# Patient Record
Sex: FEMALE | Race: WHITE | NOT HISPANIC OR LATINO | Employment: UNEMPLOYED | URBAN - METROPOLITAN AREA
[De-identification: names, ages, dates, MRNs, and addresses within clinical notes are randomized per-mention and may not be internally consistent; named-entity substitution may affect disease eponyms.]

---

## 2018-02-19 DIAGNOSIS — E03.9 HYPOTHYROIDISM, UNSPECIFIED TYPE: Primary | ICD-10-CM

## 2018-02-19 DIAGNOSIS — E20.9 HYPOPARATHYROIDISM, UNSPECIFIED HYPOPARATHYROIDISM TYPE (HCC): ICD-10-CM

## 2018-02-19 RX ORDER — LEVOTHYROXINE SODIUM 112 UG/1
112 TABLET ORAL DAILY
Qty: 30 TABLET | Refills: 0 | Status: SHIPPED | OUTPATIENT
Start: 2018-02-19 | End: 2018-04-27 | Stop reason: DRUGHIGH

## 2018-02-19 RX ORDER — CALCITRIOL 0.25 UG/1
0.25 CAPSULE, LIQUID FILLED ORAL 2 TIMES DAILY
Qty: 60 CAPSULE | Refills: 0 | Status: SHIPPED | OUTPATIENT
Start: 2018-02-19 | End: 2018-04-27

## 2018-04-27 ENCOUNTER — TELEPHONE (OUTPATIENT)
Dept: ENDOCRINOLOGY | Facility: HOSPITAL | Age: 63
End: 2018-04-27

## 2018-04-27 ENCOUNTER — OFFICE VISIT (OUTPATIENT)
Dept: ENDOCRINOLOGY | Facility: HOSPITAL | Age: 63
End: 2018-04-27
Payer: COMMERCIAL

## 2018-04-27 VITALS
HEIGHT: 62 IN | BODY MASS INDEX: 33.49 KG/M2 | WEIGHT: 182 LBS | DIASTOLIC BLOOD PRESSURE: 90 MMHG | SYSTOLIC BLOOD PRESSURE: 122 MMHG | HEART RATE: 84 BPM

## 2018-04-27 DIAGNOSIS — E89.0 POSTOPERATIVE HYPOTHYROIDISM: Primary | ICD-10-CM

## 2018-04-27 DIAGNOSIS — I49.8 POTS (POSTURAL ORTHOSTATIC TACHYCARDIA SYNDROME): ICD-10-CM

## 2018-04-27 DIAGNOSIS — E20.9 HYPOPARATHYROIDISM, UNSPECIFIED HYPOPARATHYROIDISM TYPE (HCC): ICD-10-CM

## 2018-04-27 PROCEDURE — 99204 OFFICE O/P NEW MOD 45 MIN: CPT | Performed by: INTERNAL MEDICINE

## 2018-04-27 RX ORDER — LEVOTHYROXINE SODIUM 0.1 MG/1
100 TABLET ORAL DAILY
Qty: 90 TABLET | Refills: 3 | Status: SHIPPED | OUTPATIENT
Start: 2018-04-27 | End: 2018-04-27 | Stop reason: SDUPTHER

## 2018-04-27 RX ORDER — DEXTROAMPHETAMINE SULFATE 10 MG/1
10 CAPSULE, EXTENDED RELEASE ORAL DAILY
COMMUNITY
Start: 2018-02-14 | End: 2018-04-27 | Stop reason: SDUPTHER

## 2018-04-27 RX ORDER — FLUTICASONE PROPIONATE 50 MCG
SPRAY, SUSPENSION (ML) NASAL
COMMUNITY
Start: 2018-04-25 | End: 2020-05-18 | Stop reason: SDUPTHER

## 2018-04-27 RX ORDER — MAG HYDROX/ALUMINUM HYD/SIMETH 400-400-40
SUSPENSION, ORAL (FINAL DOSE FORM) ORAL
COMMUNITY

## 2018-04-27 RX ORDER — LEVOTHYROXINE SODIUM 0.1 MG/1
100 TABLET ORAL DAILY
COMMUNITY
Start: 2018-03-06 | End: 2018-04-27 | Stop reason: SDUPTHER

## 2018-04-27 RX ORDER — LEVOTHYROXINE SODIUM 0.1 MG/1
100 TABLET ORAL DAILY
Qty: 90 TABLET | Refills: 3 | Status: SHIPPED | OUTPATIENT
Start: 2018-04-27 | End: 2018-09-12 | Stop reason: SDUPTHER

## 2018-04-27 RX ORDER — CALCIUM CARBONATE 300MG(750)
TABLET,CHEWABLE ORAL
COMMUNITY

## 2018-04-27 RX ORDER — DEXTROAMPHETAMINE SULFATE 10 MG/1
10 CAPSULE, EXTENDED RELEASE ORAL DAILY
Qty: 90 CAPSULE | Refills: 0 | Status: SHIPPED | OUTPATIENT
Start: 2018-04-27 | End: 2018-09-12 | Stop reason: SDUPTHER

## 2018-04-27 NOTE — TELEPHONE ENCOUNTER
I need this patient's chart pulled to review things like surgical pathology, her orthostatic hypotension work up, and calcium labs  This is not a rush but would it be possible to get this to review? Thanks! pseudohyponatremia  correct Na near 135, also hemolyzed sample

## 2018-04-27 NOTE — LETTER
April 27, 2018     Levonne Counter  [address]  [address]    Patient: Harry Ambriz   YOB: 1955   Date of Visit: 4/27/2018       Dear Dr Rosa Metz: Thank you for referring Harry Ambriz to me for evaluation  Below are my notes for this consultation  If you have questions, please do not hesitate to call me  I look forward to following your patient along with you  Sincerely,        Beba Garrett DO        CC: No Recipients  Beba Garrett DO  4/27/2018  3:40 PM  Sign at close encounter  4/27/2018    Assessment/Plan      Diagnoses and all orders for this visit:    Postoperative hypothyroidism  -     Discontinue: levothyroxine 100 mcg tablet; Take 1 tablet (100 mcg total) by mouth daily  -     TSH, 3rd generation Lab Collect; Future  -     T4, free Lab Collect; Future  -     TSH, 3rd generation Lab Collect  -     T4, free Lab Collect  -     levothyroxine 100 mcg tablet; Take 1 tablet (100 mcg total) by mouth daily    Hypoparathyroidism, unspecified hypoparathyroidism type ([facility] [address] )  -     Comprehensive metabolic panel Lab Collect; Future  -     PTH, intact- Lab Collect; Future  -     Vitamin D 25 hydroxy Lab Collect; Future  -     Comprehensive metabolic panel Lab Collect  -     PTH, intact- Lab Collect  -     Vitamin D 25 hydroxy Lab Collect  -     Comprehensive metabolic panel Lab Collect; Future  -     PTH, intact- Lab Collect; Future  -     Vitamin D 25 hydroxy Lab Collect; Future  -     Comprehensive metabolic panel Lab Collect  -     PTH, intact- Lab Collect  -     Vitamin D 25 hydroxy Lab Collect    POTS (postural orthostatic tachycardia syndrome)  -     dextroamphetamine (DEXEDRINE SPANSULE) 10 MG 24 hr capsule; Take 1 capsule (10 mg total) by mouth daily Max Daily Amount: 10 mg    Other orders  -     Discontinue: dextroamphetamine (DEXEDRINE SPANSULE) 10 MG 24 hr capsule; Take 10 mg by mouth daily  -     Discontinue: levothyroxine 100 mcg tablet;  Take 100 mcg by mouth daily  -     fluticasone (FLONASE) 50 mcg/act nasal spray;   -     Cholecalciferol (VITAMIN D3) 5000 units CAPS; Take by mouth  -     Calcium Carbonate-Vitamin D (CALCIUM 600+D PO); Take by mouth  -     Melatonin 10 MG TBDP; Take by mouth        Assessment/Plan:  1  Postoperative hypothyroidism:  Her dose looks on target with levothyroxine 100 mcg daily with recent blood work  Will continue this recheck a TSH and free T4 before next appointment in 6 months  She did have her thyroidectomy at [facility] in February of 2017  Apparently there was a microcarcinoma there  I will acquire her old chart review these records for completeness  Likely we do not need to do any additional treatment for this  2   Hypoparathyroidism:  Suspect she had transient hypoparathyroidism after thyroidectomy  She is only on calcitriol 0 25 mcg daily as well as calcium once a day  Her recent parathyroid hormone levels detectable  Her calcium level was ample at 9 2 as well  I will ask her to hold her calcitriol and repeat a CMP, 25 hydroxy vitamin-D, PTH in 1 week  If this looks okay, we can continue off of this and consider her hypoparathyroidism improved  We discussed that if she develops any worsening muscle cramping, numbness, tingling, perioral paresthesias, difficulty breathing, she should contact us right away as she probably needs to restart Calcitriol and take extra calcium at that time  Advised her that we have a provider on-call 24/7 if she needs assistance in this regard  3   History of POTS: I refilled the patient's Dexedrine as she states her prior endocrinologist used to fill this for her but I do not have the chart for review at this time    I gave her a prescription for this, but I will need to review her chart for any future refills to assess prior workup, prior treatments, etc       CC:   Hypothyroidism    History of Present Illness     HPI: Melia Friday is a 59y o  year old female with history of hypothyroidism who presents to [facility] care  Previous patient doctor Nahid  She had a history of goiter and thyroid nodules reportedly that eventually underwent surgery for neck compressive symptoms at [facility] in February 2017  She then was treated for postoperative hypothyroidism levothyroxine 100 mcg daily  Overall, she feels okay but notes chronic joint aches, pains, muscle cramping  She reports she was told there was a small focus of thyroid cancer in her surgical pathology, but was told this did not need any further treatment  I do not have these records at this time  No neck compressive symptoms  She is also taking calcitriol 0 25 mcg daily as well as calcium 600 mg with vitamin-D daily along with 5000 units of vitamin-D daily for what I suspect is postoperative hypoparathyroidism  She states she was told there was 1 parathyroid on her surgical pathology  Recent blood work indicates a PTH level that is detectable at 16 with a reference range of 10-65 with a concurrent calcium level of 9 2  She reports chronic issues with joint aches pains and muscle cramping but denies any worsening in the symptoms as well as worsening in paresthesias or perioral paresthesias or shortness of breath  She is following with her family doctor for rheumatologic workup  She also has a reported history of POTS and takes DEXEDRINE for this and reportedly is prescribed this through her past endocrinologist which helps her POTS  I do not have these records at this time  Review of Systems   Constitutional: Positive for fatigue  Negative for chills and fever  HENT: Negative for trouble swallowing and voice change  Eyes: Negative for visual disturbance  Respiratory: Negative for shortness of breath  Cardiovascular: Negative for chest pain, palpitations and leg swelling  Gastrointestinal: Negative for abdominal pain, nausea and vomiting  Endocrine: Negative for polydipsia and polyuria  Musculoskeletal: Positive for arthralgias and myalgias  Skin: Negative for rash  Neurological: Positive for dizziness and light-headedness  Negative for tremors and weakness  Hematological: Negative for adenopathy  Psychiatric/Behavioral: Negative for agitation and confusion  Historical Information   History reviewed  No pertinent past medical history  History reviewed  No pertinent surgical history  Social History   History   Alcohol use Not on file     History   Drug use: Unknown     History   Smoking Status    Current Every Day Smoker    Packs/day: 0 33    Types: Cigarettes    Start date: [place]   Smokeless Tobacco    Never Used     Family History:   Family History   Problem Relation Age of Onset    Arthritis Mother     Melanoma Mother     Cancer Mother     Hypertension Mother     Colon cancer Father     Heart attack Brother     Hypertension Brother        Meds/Allergies   Current Outpatient Prescriptions   Medication Sig Dispense Refill    calcitriol (ROCALTROL) 0 25 mcg capsule Take 1 capsule (0 25 mcg total) by mouth 2 (two) times a day 60 capsule 0    Calcium Carbonate-Vitamin D (CALCIUM 600+D PO) Take by mouth      Cholecalciferol (VITAMIN D3) 5000 units CAPS Take by mouth      dextroamphetamine (DEXEDRINE SPANSULE) 10 MG 24 hr capsule Take 1 capsule (10 mg total) by mouth daily Max Daily Amount: 10 mg 90 capsule 0    fluticasone (FLONASE) 50 mcg/act nasal spray       levothyroxine 100 mcg tablet Take 1 tablet (100 mcg total) by mouth daily 90 tablet 3    Melatonin 10 MG TBDP Take by mouth       No current facility-administered medications for this visit  Allergies   Allergen Reactions    Other Shortness Of Breath and Swelling     Allergy to bee stings    Penicillins Other (See Comments)     Reaction unknown       Objective   Vitals: Blood pressure 122/90, pulse 84, height 5' 2" (1 575 m), weight 82 6 kg (182 lb)    Invasive Devices          No matching active lines, drains, or airways          Physical Exam   Constitutional: She is oriented to person, place, and time  She appears well-developed and well-nourished  No distress  HENT:   Head: Normocephalic and atraumatic  Mouth/Throat: No oropharyngeal exudate  Eyes: Conjunctivae and EOM are normal  Pupils are equal, round, and reactive to light  No scleral icterus  Neck: Normal range of motion  Neck supple  Cardiovascular: Normal rate and regular rhythm  No murmur heard  Pulmonary/Chest: Effort normal and breath sounds normal  She has no wheezes  Abdominal: Soft  Bowel sounds are normal  She exhibits no distension  There is no tenderness  Musculoskeletal: Normal range of motion  She exhibits no edema  Lymphadenopathy:     She has no cervical adenopathy  Neurological: She is alert and oriented to person, place, and time  She exhibits normal muscle tone  Skin: Skin is warm and dry  No rash noted  She is not diaphoretic  Psychiatric: She has a normal mood and affect  Her behavior is normal        The history was obtained from the review of the chart and from the patient  Lab Results:      [facility] labs from 04/23/2018:  Glucose 96, BUN 10, creatinine 0 81, GFR 78, sodium 142, potassium 4 2, calcium 9 2, albumin 4 3, bilirubin 0 4, alk phos 89, AST 15, ALT 10, TSH 0 705, total T4 8 6, T3 uptake 29, free thyroxine index 2 5, PTH 16 (10-65)      Future Appointments  Date Time Provider Kalia Madrid   10/30/2018 9:45 AM Anner Aase, CRNP [facility]   4/30/2019 9:10 AM Gardenia Faulkner DO [facility]

## 2018-04-27 NOTE — PROGRESS NOTES
4/27/2018    Assessment/Plan      Diagnoses and all orders for this visit:    Postoperative hypothyroidism  -     Discontinue: levothyroxine 100 mcg tablet; Take 1 tablet (100 mcg total) by mouth daily  -     TSH, 3rd generation Lab Collect; Future  -     T4, free Lab Collect; Future  -     TSH, 3rd generation Lab Collect  -     T4, free Lab Collect  -     levothyroxine 100 mcg tablet; Take 1 tablet (100 mcg total) by mouth daily    Hypoparathyroidism, unspecified hypoparathyroidism type ([facility] [address] )  -     Comprehensive metabolic panel Lab Collect; Future  -     PTH, intact- Lab Collect; Future  -     Vitamin D 25 hydroxy Lab Collect; Future  -     Comprehensive metabolic panel Lab Collect  -     PTH, intact- Lab Collect  -     Vitamin D 25 hydroxy Lab Collect  -     Comprehensive metabolic panel Lab Collect; Future  -     PTH, intact- Lab Collect; Future  -     Vitamin D 25 hydroxy Lab Collect; Future  -     Comprehensive metabolic panel Lab Collect  -     PTH, intact- Lab Collect  -     Vitamin D 25 hydroxy Lab Collect    POTS (postural orthostatic tachycardia syndrome)  -     dextroamphetamine (DEXEDRINE SPANSULE) 10 MG 24 hr capsule; Take 1 capsule (10 mg total) by mouth daily Max Daily Amount: 10 mg    Other orders  -     Discontinue: dextroamphetamine (DEXEDRINE SPANSULE) 10 MG 24 hr capsule; Take 10 mg by mouth daily  -     Discontinue: levothyroxine 100 mcg tablet; Take 100 mcg by mouth daily  -     fluticasone (FLONASE) 50 mcg/act nasal spray;   -     Cholecalciferol (VITAMIN D3) 5000 units CAPS; Take by mouth  -     Calcium Carbonate-Vitamin D (CALCIUM 600+D PO); Take by mouth  -     Melatonin 10 MG TBDP; Take by mouth        Assessment/Plan:  1  Postoperative hypothyroidism:  Her dose looks on target with levothyroxine 100 mcg daily with recent blood work  Will continue this recheck a TSH and free T4 before next appointment in 6 months  She did have her thyroidectomy at [facility] in February of 2017  Apparently there was a microcarcinoma there  I will acquire her old chart review these records for completeness  Likely we do not need to do any additional treatment for this  2   Hypoparathyroidism:  Suspect she had transient hypoparathyroidism after thyroidectomy  She is only on calcitriol 0 25 mcg daily as well as calcium once a day  Her recent parathyroid hormone levels detectable  Her calcium level was ample at 9 2 as well  I will ask her to hold her calcitriol and repeat a CMP, 25 hydroxy vitamin-D, PTH in 1 week  If this looks okay, we can continue off of this and consider her hypoparathyroidism improved  We discussed that if she develops any worsening muscle cramping, numbness, tingling, perioral paresthesias, difficulty breathing, she should contact us right away as she probably needs to restart Calcitriol and take extra calcium at that time  Advised her that we have a provider on-call 24/7 if she needs assistance in this regard  3   History of POTS: I refilled the patient's Dexedrine as she states her prior endocrinologist used to fill this for her but I do not have the chart for review at this time  I gave her a prescription for this, but I will need to review her chart for any future refills to assess prior workup, prior treatments, etc       CC:   Hypothyroidism    History of Present Illness     HPI: Mansi Greenberg is a 59y o  year old female with history of hypothyroidism who presents to establish care  Previous patient doctor Whitfield  She had a history of goiter and thyroid nodules reportedly that eventually underwent surgery for neck compressive symptoms at [facility] in February 2017  She then was treated for postoperative hypothyroidism levothyroxine 100 mcg daily  Overall, she feels okay but notes chronic joint aches, pains, muscle cramping    She reports she was told there was a small focus of thyroid cancer in her surgical pathology, but was told this did not need any further treatment  I do not have these records at this time  No neck compressive symptoms  She is also taking calcitriol 0 25 mcg daily as well as calcium 600 mg with vitamin-D daily along with 5000 units of vitamin-D daily for what I suspect is postoperative hypoparathyroidism  She states she was told there was 1 parathyroid on her surgical pathology  Recent blood work indicates a PTH level that is detectable at 16 with a reference range of 10-65 with a concurrent calcium level of 9 2  She reports chronic issues with joint aches pains and muscle cramping but denies any worsening in the symptoms as well as worsening in paresthesias or perioral paresthesias or shortness of breath  She is following with her family doctor for rheumatologic workup  She also has a reported history of POTS and takes DEXEDRINE for this and reportedly is prescribed this through her past endocrinologist which helps her POTS  I do not have these records at this time  Review of Systems   Constitutional: Positive for fatigue  Negative for chills and fever  HENT: Negative for trouble swallowing and voice change  Eyes: Negative for visual disturbance  Respiratory: Negative for shortness of breath  Cardiovascular: Negative for chest pain, palpitations and leg swelling  Gastrointestinal: Negative for abdominal pain, nausea and vomiting  Endocrine: Negative for polydipsia and polyuria  Musculoskeletal: Positive for arthralgias and myalgias  Skin: Negative for rash  Neurological: Positive for dizziness and light-headedness  Negative for tremors and weakness  Hematological: Negative for adenopathy  Psychiatric/Behavioral: Negative for agitation and confusion  Historical Information   History reviewed  No pertinent past medical history  History reviewed  No pertinent surgical history    Social History   History   Alcohol use Not on file     History   Drug use: Unknown     History   Smoking Status    Current Every Day Smoker    Packs/day: 0 33    Types: Cigarettes    Start date: 1988   Smokeless Tobacco    Never Used     Family History:   Family History   Problem Relation Age of Onset    Arthritis Mother     Melanoma Mother     Cancer Mother     Hypertension Mother     Colon cancer Father     Heart attack Brother     Hypertension Brother        Meds/Allergies   Current Outpatient Prescriptions   Medication Sig Dispense Refill    calcitriol (ROCALTROL) 0 25 mcg capsule Take 1 capsule (0 25 mcg total) by mouth 2 (two) times a day 60 capsule 0    Calcium Carbonate-Vitamin D (CALCIUM 600+D PO) Take by mouth      Cholecalciferol (VITAMIN D3) 5000 units CAPS Take by mouth      dextroamphetamine (DEXEDRINE SPANSULE) 10 MG 24 hr capsule Take 1 capsule (10 mg total) by mouth daily Max Daily Amount: 10 mg 90 capsule 0    fluticasone (FLONASE) 50 mcg/act nasal spray       levothyroxine 100 mcg tablet Take 1 tablet (100 mcg total) by mouth daily 90 tablet 3    Melatonin 10 MG TBDP Take by mouth       No current facility-administered medications for this visit  Allergies   Allergen Reactions    Other Shortness Of Breath and Swelling     Allergy to bee stings    Penicillins Other (See Comments)     Reaction unknown       Objective   Vitals: Blood pressure 122/90, pulse 84, height 5' 2" (1 575 m), weight 82 6 kg (182 lb)  Invasive Devices          No matching active lines, drains, or airways          Physical Exam   Constitutional: She is oriented to person, place, and time  She appears well-developed and well-nourished  No distress  HENT:   Head: Normocephalic and atraumatic  Mouth/Throat: No oropharyngeal exudate  Eyes: Conjunctivae and EOM are normal  Pupils are equal, round, and reactive to light  No scleral icterus  Neck: Normal range of motion  Neck supple  Cardiovascular: Normal rate and regular rhythm  No murmur heard    Pulmonary/Chest: Effort normal and breath sounds normal  She has no wheezes  Abdominal: Soft  Bowel sounds are normal  She exhibits no distension  There is no tenderness  Musculoskeletal: Normal range of motion  She exhibits no edema  Lymphadenopathy:     She has no cervical adenopathy  Neurological: She is alert and oriented to person, place, and time  She exhibits normal muscle tone  Skin: Skin is warm and dry  No rash noted  She is not diaphoretic  Psychiatric: She has a normal mood and affect  Her behavior is normal        The history was obtained from the review of the chart and from the patient  Lab Results:      [facility] labs from 04/23/2018:  Glucose 96, BUN 10, creatinine 0 81, GFR 78, sodium 142, potassium 4 2, calcium 9 2, albumin 4 3, bilirubin 0 4, alk phos 89, AST 15, ALT 10, TSH 0 705, total T4 8 6, T3 uptake 29, free thyroxine index 2 5, PTH 16 (10-65)      Future Appointments  Date Time Provider Kalia [place]   10/30/2018 9:45 AM FALGUNI Brown [facility]   4/30/2019 9:10 AM Emily Jalloh DO [facility]

## 2018-04-30 ENCOUNTER — TELEPHONE (OUTPATIENT)
Dept: ENDOCRINOLOGY | Facility: HOSPITAL | Age: 63
End: 2018-04-30

## 2018-04-30 NOTE — TELEPHONE ENCOUNTER
Received patient's chart for review  -regarding the patient's edema and orthostatic tachycardia, she has been on the dextroamphetamine spansule since 1985 from doctor Jason Andrea and has been doing well on this over the years   -surgical pathology from February 2017 showed in the right thyroid lobe an incidental papillary microcarcinoma, 0 2 cm, organ confined  Remainder of the gland told multinodular goiter  There was 1 normal cellular parathyroid gland  There is also 1 benign lymph node  The left thyroid lobes showed multinodular goiter

## 2018-05-05 LAB
25(OH)D3+25(OH)D2 SERPL-MCNC: 48 NG/ML (ref 30–100)
ALBUMIN SERPL-MCNC: 4.2 G/DL (ref 3.6–4.8)
ALBUMIN/GLOB SERPL: 1.6 {RATIO} (ref 1.2–2.2)
ALP SERPL-CCNC: 92 IU/L (ref 39–117)
ALT SERPL-CCNC: 11 IU/L (ref 0–32)
AST SERPL-CCNC: 17 IU/L (ref 0–40)
BILIRUB SERPL-MCNC: 0.2 MG/DL (ref 0–1.2)
BUN SERPL-MCNC: 15 MG/DL (ref 8–27)
BUN/CREAT SERPL: 19 (ref 12–28)
CALCIUM SERPL-MCNC: 8.9 MG/DL (ref 8.7–10.3)
CHLORIDE SERPL-SCNC: 102 MMOL/L (ref 96–106)
CO2 SERPL-SCNC: 24 MMOL/L (ref 18–29)
CREAT SERPL-MCNC: 0.79 MG/DL (ref 0.57–1)
GLOBULIN SER-MCNC: 2.6 G/DL (ref 1.5–4.5)
GLUCOSE SERPL-MCNC: 86 MG/DL (ref 65–99)
POTASSIUM SERPL-SCNC: 4.3 MMOL/L (ref 3.5–5.2)
PROT SERPL-MCNC: 6.8 G/DL (ref 6–8.5)
PTH-INTACT SERPL-MCNC: 10 PG/ML (ref 15–65)
SL AMB EGFR AFRICAN AMERICAN: 93 ML/MIN/1.73
SL AMB EGFR NON AFRICAN AMERICAN: 80 ML/MIN/1.73
SODIUM SERPL-SCNC: 143 MMOL/L (ref 134–144)

## 2018-05-08 DIAGNOSIS — E20.9 HYPOPARATHYROIDISM, UNSPECIFIED HYPOPARATHYROIDISM TYPE (HCC): Primary | ICD-10-CM

## 2018-05-09 DIAGNOSIS — E20.9 HYPOPARATHYROIDISM, UNSPECIFIED HYPOPARATHYROIDISM TYPE (HCC): Primary | ICD-10-CM

## 2018-05-19 LAB
ALBUMIN SERPL-MCNC: 4.4 G/DL (ref 3.6–4.8)
ALBUMIN/GLOB SERPL: 1.6 {RATIO} (ref 1.2–2.2)
ALP SERPL-CCNC: 90 IU/L (ref 39–117)
ALT SERPL-CCNC: 10 IU/L (ref 0–32)
AST SERPL-CCNC: 14 IU/L (ref 0–40)
BILIRUB SERPL-MCNC: 0.4 MG/DL (ref 0–1.2)
BUN SERPL-MCNC: 14 MG/DL (ref 8–27)
BUN/CREAT SERPL: 18 (ref 12–28)
CALCIUM SERPL-MCNC: 8.8 MG/DL (ref 8.7–10.3)
CHLORIDE SERPL-SCNC: 102 MMOL/L (ref 96–106)
CO2 SERPL-SCNC: 23 MMOL/L (ref 18–29)
CREAT SERPL-MCNC: 0.77 MG/DL (ref 0.57–1)
GLOBULIN SER-MCNC: 2.7 G/DL (ref 1.5–4.5)
GLUCOSE SERPL-MCNC: 93 MG/DL (ref 65–99)
POTASSIUM SERPL-SCNC: 4.2 MMOL/L (ref 3.5–5.2)
PROT SERPL-MCNC: 7.1 G/DL (ref 6–8.5)
PTH-INTACT SERPL-MCNC: 19 PG/ML (ref 15–65)
SL AMB EGFR AFRICAN AMERICAN: 96 ML/MIN/1.73
SL AMB EGFR NON AFRICAN AMERICAN: 83 ML/MIN/1.73
SODIUM SERPL-SCNC: 142 MMOL/L (ref 134–144)

## 2018-09-12 DIAGNOSIS — I49.8 POTS (POSTURAL ORTHOSTATIC TACHYCARDIA SYNDROME): ICD-10-CM

## 2018-09-12 DIAGNOSIS — E89.0 POSTOPERATIVE HYPOTHYROIDISM: ICD-10-CM

## 2018-09-12 RX ORDER — LEVOTHYROXINE SODIUM 0.1 MG/1
100 TABLET ORAL DAILY
Qty: 30 TABLET | Refills: 0 | Status: SHIPPED | OUTPATIENT
Start: 2018-09-12 | End: 2019-04-02 | Stop reason: SDUPTHER

## 2018-09-12 RX ORDER — DEXTROAMPHETAMINE SULFATE 10 MG/1
10 CAPSULE, EXTENDED RELEASE ORAL DAILY
Qty: 90 CAPSULE | Refills: 0 | Status: SHIPPED | OUTPATIENT
Start: 2018-09-12 | End: 2018-09-14 | Stop reason: SDUPTHER

## 2018-09-12 NOTE — TELEPHONE ENCOUNTER
Pt needs a 30 day supply of her Levothyroxine sent to the local pharmacy  She also needs a 90 day of the Dextroamphetamine sent to [facility]  You have filled that before but isn't that for ADHD? I did not set that up because I wasn't sure if you were ok with filling it

## 2018-09-14 DIAGNOSIS — I49.8 POTS (POSTURAL ORTHOSTATIC TACHYCARDIA SYNDROME): ICD-10-CM

## 2018-09-14 RX ORDER — DEXTROAMPHETAMINE SULFATE 10 MG/1
10 CAPSULE, EXTENDED RELEASE ORAL DAILY
Qty: 90 CAPSULE | Refills: 0 | Status: SHIPPED | OUTPATIENT
Start: 2018-09-14 | End: 2018-12-13 | Stop reason: SDUPTHER

## 2018-09-14 NOTE — TELEPHONE ENCOUNTER
I faxed a paper script to [facility] for patients Dextroamphetamine but they wont accept it because they can't tell if the signature is handwritten or stamped  They want a hard copy sent to them but I got rid of the script when the fax went through  Can you please reprint another one?

## 2018-10-27 LAB
25(OH)D3+25(OH)D2 SERPL-MCNC: 63.6 NG/ML (ref 30–100)
ALBUMIN SERPL-MCNC: 4.4 G/DL (ref 3.6–4.8)
ALBUMIN/GLOB SERPL: 1.8 {RATIO} (ref 1.2–2.2)
ALP SERPL-CCNC: 82 IU/L (ref 39–117)
ALT SERPL-CCNC: 14 IU/L (ref 0–32)
AST SERPL-CCNC: 16 IU/L (ref 0–40)
BILIRUB SERPL-MCNC: 0.4 MG/DL (ref 0–1.2)
BUN SERPL-MCNC: 9 MG/DL (ref 8–27)
BUN/CREAT SERPL: 11 (ref 12–28)
CALCIUM SERPL-MCNC: 9.1 MG/DL (ref 8.7–10.3)
CHLORIDE SERPL-SCNC: 104 MMOL/L (ref 96–106)
CO2 SERPL-SCNC: 22 MMOL/L (ref 20–29)
CREAT SERPL-MCNC: 0.79 MG/DL (ref 0.57–1)
GLOBULIN SER-MCNC: 2.5 G/DL (ref 1.5–4.5)
GLUCOSE SERPL-MCNC: 80 MG/DL (ref 65–99)
POTASSIUM SERPL-SCNC: 4.1 MMOL/L (ref 3.5–5.2)
PROT SERPL-MCNC: 6.9 G/DL (ref 6–8.5)
PTH-INTACT SERPL-MCNC: 14 PG/ML (ref 15–65)
SL AMB EGFR AFRICAN AMERICAN: 93 ML/MIN/1.73
SL AMB EGFR NON AFRICAN AMERICAN: 80 ML/MIN/1.73
SODIUM SERPL-SCNC: 144 MMOL/L (ref 134–144)
T4 FREE SERPL-MCNC: 1.36 NG/DL (ref 0.82–1.77)
TSH SERPL DL<=0.005 MIU/L-ACNC: 0.51 UIU/ML (ref 0.45–4.5)

## 2018-10-30 ENCOUNTER — OFFICE VISIT (OUTPATIENT)
Dept: ENDOCRINOLOGY | Facility: HOSPITAL | Age: 63
End: 2018-10-30
Payer: COMMERCIAL

## 2018-10-30 VITALS
HEART RATE: 102 BPM | WEIGHT: 186.8 LBS | SYSTOLIC BLOOD PRESSURE: 112 MMHG | HEIGHT: 62 IN | BODY MASS INDEX: 34.37 KG/M2 | DIASTOLIC BLOOD PRESSURE: 78 MMHG

## 2018-10-30 DIAGNOSIS — E89.0 POSTOPERATIVE HYPOTHYROIDISM: Primary | ICD-10-CM

## 2018-10-30 DIAGNOSIS — E20.9 HYPOPARATHYROIDISM, UNSPECIFIED HYPOPARATHYROIDISM TYPE (HCC): ICD-10-CM

## 2018-10-30 PROCEDURE — 99213 OFFICE O/P EST LOW 20 MIN: CPT | Performed by: NURSE PRACTITIONER

## 2018-10-30 RX ORDER — LANOLIN ALCOHOL/MO/W.PET/CERES
1000 CREAM (GRAM) TOPICAL
COMMUNITY

## 2018-10-30 NOTE — PROGRESS NOTES
Scotty Jimenez 59 y o  female MRN: [id number]    Encounter: [id number]      Assessment/Plan     Assessment: This is a 59y o -year-old female with postoperative hypothyroidism and hypoparathyroidism  Plan:  1  Postoperative hypothyroidism:   Generally, she feels well  Her most recent TSH and free T4 are normal  Will continue this recheck a TSH and free T4 before next appointment in 6 months        2  Hypoparathyroidism:  She is currently taking on calcitriol 0 25 mcg daily and calcium 750 mg once a daily  Her most recent calcium level is normal with a slightly low PTH 14  For now, she will continue Calcitriol 0 25 mcg every other day with calcium  She will contact the office if she develops any worsening muscle cramping, numbness, tingling, perioral paresthesias or difficulty breathing        CC:   Hypothyroidism follow-up    History of Present Illness     HPI:  59y o  year old female with history of hypothyroidism who presents for follow-up  She had a history of goiter and thyroid nodules reportedly that eventually underwent surgery for neck compressive symptoms at [facility] in February 2017  She then was treated for postoperative hypothyroidism levothyroxine 100 mcg daily  Her most recent TSH from October 26, 2018 is 0 508 with a free T4 of 1 36  Overall, she feels okay but notes chronic joint aches, pains, muscle cramping and she is cleaning her house  She reports she was told there was a small focus of thyroid cancer in her surgical pathology, but was told this did not need any further treatment  She denies any anterior neck discomfort, dysphagia or dysphonia      She is also taking calcitriol 0 25 mcg on Sunday, Tuesday, Thursday and Saturday, as well as, calcium 750 mg every evening with vitamin-D daily along with 5000 units of vitamin-D daily for postoperative hypoparathyroidism  her most recent calcium from October 26, 2018 is 9 1 with an albumin of 4 4 and PTH of 14    Her vitamin-D level is 63 6  She reports chronic issues with joint/back aches  and muscle cramping but denies any worsening paresthesias, perioral paresthesias or shortness of breath      Review of Systems   Constitutional: Positive for fatigue (tired at times)  Negative for chills and fever  HENT: Negative  Negative for trouble swallowing and voice change  Eyes: Negative  Respiratory: Negative  Negative for chest tightness and shortness of breath  Cardiovascular: Negative  Negative for chest pain  Gastrointestinal: Negative  Negative for abdominal pain, constipation, diarrhea and vomiting  Endocrine: Positive for cold intolerance (recently)  Negative for heat intolerance, polydipsia, polyphagia and polyuria  Genitourinary: Negative  Musculoskeletal: Positive for back pain (chronic low back pain) and myalgias (cramping in hands at times)  Skin: Negative  Allergic/Immunologic: Negative  Neurological: Positive for dizziness (very infrequent)  Negative for syncope, light-headedness and headaches  Hematological: Negative  Psychiatric/Behavioral: Positive for sleep disturbance (difficutly getting to sleep at times)  All other systems reviewed and are negative  Historical Information   No past medical history on file  No past surgical history on file    Social History   History   Alcohol use Not on file     History   Drug use: Unknown     History   Smoking Status    Current Every Day Smoker    Packs/day: 0 33    Types: Cigarettes    Start date: 80   Smokeless Tobacco    Never Used     Family History:   Family History   Problem Relation Age of Onset    Arthritis Mother     Melanoma Mother     Cancer Mother     Hypertension Mother     Colon cancer Father     Heart attack Brother     Hypertension Brother        Meds/Allergies   Current Outpatient Prescriptions   Medication Sig Dispense Refill    Calcium Carbonate-Vitamin D (CALCIUM 600+D PO) Take by mouth      Cholecalciferol (VITAMIN D3) 5000 units CAPS Take by mouth      cyanocobalamin (VITAMIN B-12) 1,000 mcg tablet Take 1,000 mcg by mouth daily      dextroamphetamine (DEXEDRINE SPANSULE) 10 MG 24 hr capsule Take 1 capsule (10 mg total) by mouth daily Max Daily Amount: 10 mg 90 capsule 0    fluticasone (FLONASE) 50 mcg/act nasal spray       levothyroxine 100 mcg tablet Take 1 tablet (100 mcg total) by mouth daily 30 tablet 0    Melatonin 10 MG TBDP Take by mouth       No current facility-administered medications for this visit  Allergies   Allergen Reactions    Other Shortness Of Breath and Swelling     Allergy to bee stings    Penicillins Other (See Comments)     Reaction unknown       Objective   Vitals: Blood pressure 112/78, pulse 102, height 5' 2" (1 575 m), weight 84 7 kg (186 lb 12 8 oz)  Physical Exam   Constitutional: She is oriented to person, place, and time  She appears well-developed and well-nourished  No distress  Overweight   HENT:   Head: Normocephalic and atraumatic  Mouth/Throat: Oropharynx is clear and moist    Eyes: Pupils are equal, round, and reactive to light  Conjunctivae and EOM are normal  Right eye exhibits no discharge  Left eye exhibits no discharge  No scleral icterus  Wears glasses   Neck: Normal range of motion  Neck supple  No JVD present  No tracheal deviation present  No thyromegaly present  Cardiovascular: Normal rate, regular rhythm, normal heart sounds and intact distal pulses  Exam reveals no gallop and no friction rub  No murmur heard  Pulmonary/Chest: Effort normal and breath sounds normal  No stridor  No respiratory distress  She has no wheezes  She has no rales  She exhibits no tenderness  Abdominal: Soft  Bowel sounds are normal  She exhibits no distension  There is no tenderness  Musculoskeletal: Normal range of motion  She exhibits no edema, tenderness or deformity  Lymphadenopathy:     She has no cervical adenopathy     Neurological: She is alert and oriented to person, place, and time  She displays normal reflexes  No cranial nerve deficit  Coordination normal    Skin: Skin is warm and dry  No rash noted  No erythema  No pallor  Dry skin   Psychiatric: She has a normal mood and affect  Her behavior is normal  Judgment and thought content normal    Vitals reviewed  Lab Results:   Lab Results   Component Value Date/Time    Free t4 1 36 10/26/2018 10:09 AM       Portions of the record may have been created with voice recognition software  Occasional wrong word or "sound a like" substitutions may have occurred due to the inherent limitations of voice recognition software  Read the chart carefully and recognize, using context, where substitutions have occurred

## 2018-12-13 DIAGNOSIS — I49.8 POTS (POSTURAL ORTHOSTATIC TACHYCARDIA SYNDROME): ICD-10-CM

## 2018-12-13 RX ORDER — DEXTROAMPHETAMINE SULFATE 10 MG/1
10 CAPSULE, EXTENDED RELEASE ORAL DAILY
Qty: 90 CAPSULE | Refills: 0 | Status: SHIPPED | OUTPATIENT
Start: 2018-12-13 | End: 2018-12-24 | Stop reason: SDUPTHER

## 2018-12-24 ENCOUNTER — TELEPHONE (OUTPATIENT)
Dept: ENDOCRINOLOGY | Facility: HOSPITAL | Age: 63
End: 2018-12-24

## 2018-12-24 DIAGNOSIS — I49.8 POTS (POSTURAL ORTHOSTATIC TACHYCARDIA SYNDROME): ICD-10-CM

## 2018-12-24 PROBLEM — G90.A POTS (POSTURAL ORTHOSTATIC TACHYCARDIA SYNDROME): Status: ACTIVE | Noted: 2018-12-24

## 2018-12-24 RX ORDER — DEXTROAMPHETAMINE SULFATE 10 MG/1
10 CAPSULE, EXTENDED RELEASE ORAL DAILY
Qty: 30 CAPSULE | Refills: 0 | Status: SHIPPED | OUTPATIENT
Start: 2018-12-24 | End: 2019-04-02 | Stop reason: SDUPTHER

## 2018-12-24 RX ORDER — DEXTROAMPHETAMINE SULFATE 10 MG/1
10 CAPSULE, EXTENDED RELEASE ORAL DAILY
Qty: 90 CAPSULE | Refills: 0 | Status: SHIPPED | OUTPATIENT
Start: 2018-12-24 | End: 2018-12-24 | Stop reason: SDUPTHER

## 2018-12-24 NOTE — TELEPHONE ENCOUNTER
Pt called last week to have her Dextroamphetamine to [facility] and It never got sent  The script was printed but I don't think it was signed and faxed  Pt is asking you to send 30 days to a local pharmacy in her chart and 90 days to [facility]  I can only set up the refill for one of them so I set it up for [facility]  The script is going to print so I will stamp your signature and fax it and hope they accept it

## 2019-04-02 DIAGNOSIS — E89.0 POSTOPERATIVE HYPOTHYROIDISM: ICD-10-CM

## 2019-04-02 DIAGNOSIS — E20.9 HYPOPARATHYROIDISM, UNSPECIFIED HYPOPARATHYROIDISM TYPE (HCC): Primary | ICD-10-CM

## 2019-04-02 DIAGNOSIS — I49.8 POTS (POSTURAL ORTHOSTATIC TACHYCARDIA SYNDROME): ICD-10-CM

## 2019-04-02 RX ORDER — DEXTROAMPHETAMINE SULFATE 10 MG/1
10 CAPSULE, EXTENDED RELEASE ORAL DAILY
Qty: 90 CAPSULE | Refills: 0 | Status: SHIPPED | OUTPATIENT
Start: 2019-04-02 | End: 2019-07-17 | Stop reason: SDUPTHER

## 2019-04-02 RX ORDER — CALCITRIOL 0.25 UG/1
CAPSULE, LIQUID FILLED ORAL
Qty: 45 CAPSULE | Refills: 1 | Status: SHIPPED | OUTPATIENT
Start: 2019-04-02 | End: 2019-07-17 | Stop reason: SDUPTHER

## 2019-04-02 RX ORDER — LEVOTHYROXINE SODIUM 0.1 MG/1
100 TABLET ORAL DAILY
Qty: 90 TABLET | Refills: 1 | Status: SHIPPED | OUTPATIENT
Start: 2019-04-02 | End: 2019-06-21 | Stop reason: SDUPTHER

## 2019-04-24 LAB
ALBUMIN SERPL-MCNC: 4.3 G/DL (ref 3.6–4.8)
ALBUMIN/GLOB SERPL: 1.7 {RATIO} (ref 1.2–2.2)
ALP SERPL-CCNC: 73 IU/L (ref 39–117)
ALT SERPL-CCNC: 10 IU/L (ref 0–32)
AST SERPL-CCNC: 14 IU/L (ref 0–40)
BILIRUB SERPL-MCNC: 0.3 MG/DL (ref 0–1.2)
BUN SERPL-MCNC: 12 MG/DL (ref 8–27)
BUN/CREAT SERPL: 15 (ref 12–28)
CALCIUM SERPL-MCNC: 9 MG/DL (ref 8.7–10.3)
CHLORIDE SERPL-SCNC: 105 MMOL/L (ref 96–106)
CO2 SERPL-SCNC: 23 MMOL/L (ref 20–29)
CREAT SERPL-MCNC: 0.82 MG/DL (ref 0.57–1)
GLOBULIN SER-MCNC: 2.6 G/DL (ref 1.5–4.5)
GLUCOSE SERPL-MCNC: 88 MG/DL (ref 65–99)
PHOSPHATE SERPL-MCNC: 3.7 MG/DL (ref 2.5–4.5)
POTASSIUM SERPL-SCNC: 4.1 MMOL/L (ref 3.5–5.2)
PROT SERPL-MCNC: 6.9 G/DL (ref 6–8.5)
PTH-INTACT SERPL-MCNC: 15 PG/ML (ref 15–65)
SL AMB EGFR AFRICAN AMERICAN: 88 ML/MIN/1.73
SL AMB EGFR NON AFRICAN AMERICAN: 76 ML/MIN/1.73
SODIUM SERPL-SCNC: 145 MMOL/L (ref 134–144)
T4 FREE SERPL-MCNC: 1.62 NG/DL (ref 0.82–1.77)
TSH SERPL DL<=0.005 MIU/L-ACNC: 0.43 UIU/ML (ref 0.45–4.5)

## 2019-05-09 ENCOUNTER — OFFICE VISIT (OUTPATIENT)
Dept: ENDOCRINOLOGY | Facility: HOSPITAL | Age: 64
End: 2019-05-09
Payer: COMMERCIAL

## 2019-05-09 VITALS
HEIGHT: 62 IN | SYSTOLIC BLOOD PRESSURE: 102 MMHG | BODY MASS INDEX: 33.38 KG/M2 | DIASTOLIC BLOOD PRESSURE: 70 MMHG | WEIGHT: 181.4 LBS | HEART RATE: 110 BPM

## 2019-05-09 DIAGNOSIS — E89.0 POSTOPERATIVE HYPOTHYROIDISM: Primary | ICD-10-CM

## 2019-05-09 DIAGNOSIS — E20.9 HYPOPARATHYROIDISM, UNSPECIFIED HYPOPARATHYROIDISM TYPE (HCC): ICD-10-CM

## 2019-05-09 PROCEDURE — 99214 OFFICE O/P EST MOD 30 MIN: CPT | Performed by: NURSE PRACTITIONER

## 2019-05-09 RX ORDER — FOLIC ACID 1 MG/1
1 TABLET ORAL DAILY
COMMUNITY

## 2019-06-21 DIAGNOSIS — E89.0 POSTOPERATIVE HYPOTHYROIDISM: ICD-10-CM

## 2019-06-21 LAB
T4 FREE SERPL-MCNC: 1.81 NG/DL (ref 0.82–1.77)
TSH SERPL DL<=0.005 MIU/L-ACNC: 0.4 UIU/ML (ref 0.45–4.5)

## 2019-06-21 RX ORDER — LEVOTHYROXINE SODIUM 0.1 MG/1
TABLET ORAL
Qty: 90 TABLET | Refills: 1
Start: 2019-06-21 | End: 2019-07-17 | Stop reason: SDUPTHER

## 2019-07-17 DIAGNOSIS — E89.0 POSTOPERATIVE HYPOTHYROIDISM: ICD-10-CM

## 2019-07-17 DIAGNOSIS — E20.9 HYPOPARATHYROIDISM, UNSPECIFIED HYPOPARATHYROIDISM TYPE (HCC): ICD-10-CM

## 2019-07-17 DIAGNOSIS — I49.8 POTS (POSTURAL ORTHOSTATIC TACHYCARDIA SYNDROME): ICD-10-CM

## 2019-07-17 RX ORDER — DEXTROAMPHETAMINE SULFATE 10 MG/1
10 CAPSULE, EXTENDED RELEASE ORAL DAILY
Qty: 90 CAPSULE | Refills: 0 | Status: SHIPPED | OUTPATIENT
Start: 2019-07-17 | End: 2019-07-22 | Stop reason: SDUPTHER

## 2019-07-17 RX ORDER — LEVOTHYROXINE SODIUM 0.1 MG/1
TABLET ORAL
Qty: 90 TABLET | Refills: 1 | Status: SHIPPED | OUTPATIENT
Start: 2019-07-17 | End: 2019-10-23 | Stop reason: SDUPTHER

## 2019-07-17 RX ORDER — CALCITRIOL 0.25 UG/1
CAPSULE, LIQUID FILLED ORAL
Qty: 45 CAPSULE | Refills: 1 | Status: SHIPPED | OUTPATIENT
Start: 2019-07-17 | End: 2019-10-09 | Stop reason: SDUPTHER

## 2019-07-17 NOTE — TELEPHONE ENCOUNTER
Pt called for refills of her Levothyroxine, Calcitriol and Dextroamphetamine please  She saw you in May and has a follow up in September  She is asking for a 90 day to mail order and and a 30 day of the Levothyroxine only to the local pharmacy  I can only set up for one so I set up for mail order  Please send the 30 day to the local pharmacy

## 2019-07-22 DIAGNOSIS — I49.8 POTS (POSTURAL ORTHOSTATIC TACHYCARDIA SYNDROME): ICD-10-CM

## 2019-07-22 RX ORDER — DEXTROAMPHETAMINE SULFATE 10 MG/1
10 CAPSULE, EXTENDED RELEASE ORAL DAILY
Qty: 90 CAPSULE | Refills: 0 | Status: SHIPPED | OUTPATIENT
Start: 2019-07-22 | End: 2019-10-09 | Stop reason: SDUPTHER

## 2019-07-22 NOTE — TELEPHONE ENCOUNTER
[facility] filled pt's dextroamphetamine and we faxed the script  [facility] is saying they can't take a faxed script and it has to be sent electronically or mailed to them  Can you please see if it will let you send it electronically?

## 2019-08-17 LAB
T4 FREE SERPL-MCNC: 1.82 NG/DL (ref 0.82–1.77)
TSH SERPL DL<=0.005 MIU/L-ACNC: 1.4 UIU/ML (ref 0.45–4.5)

## 2019-10-09 DIAGNOSIS — E20.9 HYPOPARATHYROIDISM, UNSPECIFIED HYPOPARATHYROIDISM TYPE (HCC): ICD-10-CM

## 2019-10-09 DIAGNOSIS — I49.8 POTS (POSTURAL ORTHOSTATIC TACHYCARDIA SYNDROME): ICD-10-CM

## 2019-10-09 RX ORDER — CALCITRIOL 0.25 UG/1
CAPSULE, LIQUID FILLED ORAL
Qty: 48 CAPSULE | Refills: 1 | Status: SHIPPED | OUTPATIENT
Start: 2019-10-09 | End: 2020-01-27 | Stop reason: SDUPTHER

## 2019-10-09 RX ORDER — DEXTROAMPHETAMINE SULFATE 10 MG/1
10 CAPSULE, EXTENDED RELEASE ORAL DAILY
Qty: 90 CAPSULE | Refills: 0 | Status: SHIPPED | OUTPATIENT
Start: 2019-10-09 | End: 2019-10-11 | Stop reason: SDUPTHER

## 2019-10-11 DIAGNOSIS — I49.8 POTS (POSTURAL ORTHOSTATIC TACHYCARDIA SYNDROME): ICD-10-CM

## 2019-10-11 RX ORDER — DEXTROAMPHETAMINE SULFATE 10 MG/1
10 CAPSULE, EXTENDED RELEASE ORAL DAILY
Qty: 90 CAPSULE | Refills: 0 | Status: SHIPPED | OUTPATIENT
Start: 2019-10-11 | End: 2019-10-23 | Stop reason: SDUPTHER

## 2019-10-23 DIAGNOSIS — I49.8 POTS (POSTURAL ORTHOSTATIC TACHYCARDIA SYNDROME): ICD-10-CM

## 2019-10-23 DIAGNOSIS — E89.0 POSTOPERATIVE HYPOTHYROIDISM: ICD-10-CM

## 2019-10-23 RX ORDER — DEXTROAMPHETAMINE SULFATE 10 MG/1
10 CAPSULE, EXTENDED RELEASE ORAL DAILY
Qty: 90 CAPSULE | Refills: 0 | Status: SHIPPED | OUTPATIENT
Start: 2019-10-23 | End: 2020-01-27 | Stop reason: SDUPTHER

## 2019-10-23 RX ORDER — LEVOTHYROXINE SODIUM 0.1 MG/1
TABLET ORAL
Qty: 90 TABLET | Refills: 1 | Status: SHIPPED | OUTPATIENT
Start: 2019-10-23 | End: 2020-01-27 | Stop reason: SDUPTHER

## 2019-11-14 ENCOUNTER — OFFICE VISIT (OUTPATIENT)
Dept: ENDOCRINOLOGY | Facility: HOSPITAL | Age: 64
End: 2019-11-14
Payer: COMMERCIAL

## 2019-11-14 VITALS
BODY MASS INDEX: 32.17 KG/M2 | DIASTOLIC BLOOD PRESSURE: 80 MMHG | WEIGHT: 174.8 LBS | SYSTOLIC BLOOD PRESSURE: 122 MMHG | HEIGHT: 62 IN | HEART RATE: 94 BPM

## 2019-11-14 DIAGNOSIS — E20.9 HYPOPARATHYROIDISM, UNSPECIFIED HYPOPARATHYROIDISM TYPE (HCC): ICD-10-CM

## 2019-11-14 DIAGNOSIS — E89.0 POSTOPERATIVE HYPOTHYROIDISM: Primary | ICD-10-CM

## 2019-11-14 LAB
25(OH)D3+25(OH)D2 SERPL-MCNC: 65 NG/ML (ref 30–100)
ALBUMIN SERPL-MCNC: 4.5 G/DL (ref 3.6–4.8)
ALBUMIN/GLOB SERPL: 1.9 {RATIO} (ref 1.2–2.2)
ALP SERPL-CCNC: 76 IU/L (ref 39–117)
ALT SERPL-CCNC: 6 IU/L (ref 0–32)
AST SERPL-CCNC: 12 IU/L (ref 0–40)
BILIRUB SERPL-MCNC: 0.5 MG/DL (ref 0–1.2)
BUN SERPL-MCNC: 13 MG/DL (ref 8–27)
BUN/CREAT SERPL: 15 (ref 12–28)
CALCIUM SERPL-MCNC: 9.8 MG/DL (ref 8.7–10.3)
CHLORIDE SERPL-SCNC: 102 MMOL/L (ref 96–106)
CO2 SERPL-SCNC: 24 MMOL/L (ref 20–29)
CREAT SERPL-MCNC: 0.86 MG/DL (ref 0.57–1)
GLOBULIN SER-MCNC: 2.4 G/DL (ref 1.5–4.5)
GLUCOSE SERPL-MCNC: 89 MG/DL (ref 65–99)
PHOSPHATE SERPL-MCNC: 4.6 MG/DL (ref 2.5–4.5)
POTASSIUM SERPL-SCNC: 3.9 MMOL/L (ref 3.5–5.2)
PROT SERPL-MCNC: 6.9 G/DL (ref 6–8.5)
PTH-INTACT SERPL-MCNC: 8 PG/ML (ref 15–65)
SL AMB EGFR AFRICAN AMERICAN: 83 ML/MIN/1.73
SL AMB EGFR NON AFRICAN AMERICAN: 72 ML/MIN/1.73
SODIUM SERPL-SCNC: 142 MMOL/L (ref 134–144)
T4 FREE SERPL-MCNC: 1.56 NG/DL (ref 0.82–1.77)
TSH SERPL DL<=0.005 MIU/L-ACNC: 1.82 UIU/ML (ref 0.45–4.5)

## 2019-11-14 PROCEDURE — 99214 OFFICE O/P EST MOD 30 MIN: CPT | Performed by: NURSE PRACTITIONER

## 2019-11-14 NOTE — PATIENT INSTRUCTIONS
For now, continue levothyroxine at current dose of 100 mcg daily      Recheck TSH and Free T4 in prior to next visit      Continue calcitriol and calcium with vitamin-D supplementation      Obtain lab work prior to next visit      You will be due for a repeat DEXA SCAN when able  We will contact you with the results

## 2019-11-14 NOTE — PROGRESS NOTES
Ishmael Gilliam 62 y o  female MRN: [id number]    Encounter: [id number]      Assessment/Plan     Assessment: This is a 62y o -year-old female with postoperative hypothyroidism and hypoparathyroidism       Plan:  1   Postoperative hypothyroidism:   Generally, she feels well  Her most recent TSH is slightly low with a free T4 in the high normal range  I have asked her to resume taking her levothyroxine 100 mcg daily consistently and recheck TSH and free T4 in 6 weeks to reassess  Titration of her levothyroxine dose may take place upon review of updated lab work      2   Hypoparathyroidism:  She is currently taking on calcitriol 0 25 mcg daily and calcium 750 mg once a daily    Her most recent calcium level is normal   For now, she will continue Calcitriol 0 25 mcg every other day with calcium   She will contact the office if she develops any worsening muscle cramping, numbness, tingling, perioral paresthesias or difficulty breathing  She will be due for DEXA scan sometime after May 25, 2019  We will contact her with results      CC:  Hypothyroidism/hypoparathyroidism follow-up    History of Present Illness     HPI:  62y o  year old female with history of hypothyroidism who presents for follow-up  She had a history of goiter and thyroid nodules reportedly that eventually underwent surgery for neck compressive symptoms at [facility] in February 2017   She then was treated for postoperative hypothyroidism levothyroxine 100 mcg daily with an extra 1/2 tablet on Sunday   Her most recent TSH from November 13, 2019 is 1 820 with a free T4 of 1 56    Overall, she feels well but notes chronic joint aches, pains, muscle cramping at times    She denies any anterior neck discomfort, dysphagia or dysphonia        She is also taking calcitriol 0 25 mcg on Sunday, Tuesday, Thursday and Saturday, as well as, calcium 750 mg every evening with vitamin-D daily along with 5000 units of vitamin-D daily for postoperative hypoparathyroidism  Velia Moralez most recent calcium from  November 13, 2019 is 9 8 with an albumin of 4 3 and PTH was not resulted at the time of the visit  Phosphorus was 4 6    Her vitamin-D level from November 13, 2019 is 65 0  She reports chronic issues with joint/back aches  and muscle cramping but denies any worsening paresthesias, perioral paresthesias or shortness of breath      Review of Systems   Constitutional: Positive for fatigue ( at times)  Negative for chills and fever  HENT: Negative  Negative for trouble swallowing and voice change  Eyes: Negative for visual disturbance  Respiratory: Negative  Negative for chest tightness and shortness of breath  Cardiovascular: Negative  Negative for chest pain  Gastrointestinal: Negative  Negative for abdominal pain, constipation, diarrhea and vomiting  Endocrine: Positive for cold intolerance ( at times)  Negative for heat intolerance, polydipsia, polyphagia and polyuria  Genitourinary: Negative  Musculoskeletal: Positive for arthralgias (Chronic) and gait problem ( chronic)  Skin: Negative  Allergic/Immunologic: Negative  Neurological: Negative for dizziness, syncope, light-headedness and headaches  Hematological: Negative  Psychiatric/Behavioral: Negative  All other systems reviewed and are negative  Historical Information   No past medical history on file  No past surgical history on file    Social History   Social History     Substance and Sexual Activity   Alcohol Use Not on file     Social History     Substance and Sexual Activity   Drug Use Not on file     Social History     Tobacco Use   Smoking Status Current Every Day Smoker    Packs/day: 0 33    Types: Cigarettes    Start date: 1988   Smokeless Tobacco Never Used     Family History:   Family History   Problem Relation Age of Onset    Arthritis Mother     Melanoma Mother     Cancer Mother     Hypertension Mother     Colon cancer Father     Heart attack Brother     Hypertension Brother        Meds/Allergies   Current Outpatient Medications   Medication Sig Dispense Refill    Biotin 5000 MCG CAPS Take 1 capsule by mouth daily      calcitriol (ROCALTROL) 0 25 mcg capsule Take one tablet by mouth Sunday, Tuesday, Thursday and Saturday  48 capsule 1    Calcium Carbonate-Vitamin D (CALCIUM 600+D PO) Take by mouth      Cholecalciferol (VITAMIN D3) 5000 units CAPS Take by mouth      cyanocobalamin (VITAMIN B-12) 1,000 mcg tablet Take 1,000 mcg by mouth Monday, Wednesday, and Friday   dextroamphetamine (DEXEDRINE SPANSULE) 10 MG 24 hr capsule Take 1 capsule (10 mg total) by mouth dailyMax Daily Amount: 10 mg 90 capsule 0    fluticasone (FLONASE) 50 mcg/act nasal spray       folic acid (FOLVITE) 1 mg tablet Take 1 mg by mouth daily      Krill Oil 300 MG CAPS Take 1 capsule by mouth daily      levothyroxine 100 mcg tablet 1 tab 6 days of the week and 0 5 tab on Sundays  90 tablet 1    Melatonin 10 MG TBDP Take by mouth       No current facility-administered medications for this visit  Allergies   Allergen Reactions    Other Shortness Of Breath and Swelling     Allergy to bee stings    Penicillins Other (See Comments)     Reaction unknown       Objective   Vitals: Blood pressure 122/80, pulse 94, height 5' 2" (1 575 m), weight 79 3 kg (174 lb 12 8 oz)  Physical Exam   Constitutional: She is oriented to person, place, and time  She appears well-developed and well-nourished  Obese   HENT:   Head: Normocephalic and atraumatic  Mouth/Throat: Oropharynx is clear and moist    Eyes: Pupils are equal, round, and reactive to light  Conjunctivae and EOM are normal    Wears glasses   Neck: Normal range of motion  Neck supple  Cardiovascular: Normal rate, regular rhythm, normal heart sounds and intact distal pulses  Pulmonary/Chest: Effort normal and breath sounds normal    Abdominal: Soft   Bowel sounds are normal    Musculoskeletal: Normal range of motion  Neurological: She is alert and oriented to person, place, and time  Skin: Skin is warm and dry  Dry skin   Psychiatric: She has a normal mood and affect  Her behavior is normal  Judgment and thought content normal    Vitals reviewed  Lab Results:   Lab Results   Component Value Date/Time    Free t4 1 82 (H) 08/16/2019 10:34 AM    Free t4 1 81 (H) 06/20/2019 09:30 AM    Free t4 1 62 04/23/2019 09:30 AM     Portions of the record may have been created with voice recognition software  Occasional wrong word or "sound a like" substitutions may have occurred due to the inherent limitations of voice recognition software  Read the chart carefully and recognize, using context, where substitutions have occurred

## 2020-01-27 DIAGNOSIS — E89.0 POSTOPERATIVE HYPOTHYROIDISM: ICD-10-CM

## 2020-01-27 DIAGNOSIS — E20.9 HYPOPARATHYROIDISM, UNSPECIFIED HYPOPARATHYROIDISM TYPE (HCC): ICD-10-CM

## 2020-01-27 DIAGNOSIS — I49.8 POTS (POSTURAL ORTHOSTATIC TACHYCARDIA SYNDROME): ICD-10-CM

## 2020-01-27 RX ORDER — CALCITRIOL 0.25 UG/1
CAPSULE, LIQUID FILLED ORAL
Qty: 48 CAPSULE | Refills: 1 | Status: SHIPPED | OUTPATIENT
Start: 2020-01-27 | End: 2020-04-13 | Stop reason: SDUPTHER

## 2020-01-27 RX ORDER — LEVOTHYROXINE SODIUM 0.1 MG/1
TABLET ORAL
Qty: 90 TABLET | Refills: 1 | Status: SHIPPED | OUTPATIENT
Start: 2020-01-27 | End: 2020-04-13 | Stop reason: SDUPTHER

## 2020-01-27 RX ORDER — DEXTROAMPHETAMINE SULFATE 10 MG/1
10 CAPSULE, EXTENDED RELEASE ORAL DAILY
Qty: 90 CAPSULE | Refills: 0 | Status: SHIPPED | OUTPATIENT
Start: 2020-01-27 | End: 2020-01-29 | Stop reason: SDUPTHER

## 2020-01-29 DIAGNOSIS — I49.8 POTS (POSTURAL ORTHOSTATIC TACHYCARDIA SYNDROME): ICD-10-CM

## 2020-01-29 RX ORDER — DEXTROAMPHETAMINE SULFATE 10 MG/1
10 CAPSULE, EXTENDED RELEASE ORAL DAILY
Qty: 90 CAPSULE | Refills: 0 | Status: SHIPPED | OUTPATIENT
Start: 2020-01-29 | End: 2020-04-13 | Stop reason: SDUPTHER

## 2020-01-29 NOTE — TELEPHONE ENCOUNTER
Constanza Evans sent a script for pt's Dexedrine on 1/27 but I received a fax stating he is not authorized to fill that because it's a Schedule 11 medication  Can you please send this?

## 2020-04-13 DIAGNOSIS — E89.0 POSTOPERATIVE HYPOTHYROIDISM: ICD-10-CM

## 2020-04-13 DIAGNOSIS — I49.8 POTS (POSTURAL ORTHOSTATIC TACHYCARDIA SYNDROME): ICD-10-CM

## 2020-04-13 DIAGNOSIS — E20.9 HYPOPARATHYROIDISM, UNSPECIFIED HYPOPARATHYROIDISM TYPE (HCC): ICD-10-CM

## 2020-04-13 RX ORDER — CALCITRIOL 0.25 UG/1
CAPSULE, LIQUID FILLED ORAL
Qty: 48 CAPSULE | Refills: 0 | Status: SHIPPED | OUTPATIENT
Start: 2020-04-13 | End: 2020-08-04

## 2020-04-13 RX ORDER — LEVOTHYROXINE SODIUM 0.1 MG/1
TABLET ORAL
Qty: 90 TABLET | Refills: 0 | Status: SHIPPED | OUTPATIENT
Start: 2020-04-13 | End: 2020-06-19

## 2020-04-13 RX ORDER — DEXTROAMPHETAMINE SULFATE 10 MG/1
10 CAPSULE, EXTENDED RELEASE ORAL DAILY
Qty: 90 CAPSULE | Refills: 0 | Status: SHIPPED | OUTPATIENT
Start: 2020-04-13 | End: 2020-08-03 | Stop reason: SDUPTHER

## 2020-05-14 LAB
25(OH)D3+25(OH)D2 SERPL-MCNC: 85.4 NG/ML (ref 30–100)
ALBUMIN SERPL-MCNC: 4.3 G/DL (ref 3.8–4.8)
ALBUMIN/GLOB SERPL: 1.9 {RATIO} (ref 1.2–2.2)
ALP SERPL-CCNC: 73 IU/L (ref 39–117)
ALT SERPL-CCNC: 9 IU/L (ref 0–32)
AST SERPL-CCNC: 16 IU/L (ref 0–40)
BILIRUB SERPL-MCNC: 0.3 MG/DL (ref 0–1.2)
BUN SERPL-MCNC: 10 MG/DL (ref 8–27)
BUN/CREAT SERPL: 11 (ref 12–28)
CALCIUM SERPL-MCNC: 9 MG/DL (ref 8.7–10.3)
CHLORIDE SERPL-SCNC: 104 MMOL/L (ref 96–106)
CO2 SERPL-SCNC: 23 MMOL/L (ref 20–29)
CREAT SERPL-MCNC: 0.88 MG/DL (ref 0.57–1)
GLOBULIN SER-MCNC: 2.3 G/DL (ref 1.5–4.5)
GLUCOSE SERPL-MCNC: 94 MG/DL (ref 65–99)
PHOSPHATE SERPL-MCNC: 4.5 MG/DL (ref 3–4.3)
POTASSIUM SERPL-SCNC: 3.8 MMOL/L (ref 3.5–5.2)
PROT SERPL-MCNC: 6.6 G/DL (ref 6–8.5)
PTH-INTACT SERPL-MCNC: 10 PG/ML (ref 15–65)
SL AMB EGFR AFRICAN AMERICAN: 80 ML/MIN/1.73
SL AMB EGFR NON AFRICAN AMERICAN: 70 ML/MIN/1.73
SODIUM SERPL-SCNC: 144 MMOL/L (ref 134–144)
T4 FREE SERPL-MCNC: 1.47 NG/DL (ref 0.82–1.77)
TSH SERPL DL<=0.005 MIU/L-ACNC: 3.42 UIU/ML (ref 0.45–4.5)

## 2020-05-18 ENCOUNTER — TELEMEDICINE (OUTPATIENT)
Dept: ENDOCRINOLOGY | Facility: HOSPITAL | Age: 65
End: 2020-05-18
Payer: COMMERCIAL

## 2020-05-18 VITALS — WEIGHT: 151 LBS | BODY MASS INDEX: 27.62 KG/M2

## 2020-05-18 DIAGNOSIS — E89.0 POSTOPERATIVE HYPOTHYROIDISM: ICD-10-CM

## 2020-05-18 DIAGNOSIS — E20.9 HYPOPARATHYROIDISM, UNSPECIFIED HYPOPARATHYROIDISM TYPE (HCC): Primary | ICD-10-CM

## 2020-05-18 PROCEDURE — 99213 OFFICE O/P EST LOW 20 MIN: CPT | Performed by: INTERNAL MEDICINE

## 2020-05-18 RX ORDER — FLUTICASONE PROPIONATE 50 MCG
SPRAY, SUSPENSION (ML) NASAL
Qty: 1 BOTTLE | Refills: 5 | Status: SHIPPED | OUTPATIENT
Start: 2020-05-18

## 2020-06-19 DIAGNOSIS — E89.0 POSTOPERATIVE HYPOTHYROIDISM: ICD-10-CM

## 2020-06-19 RX ORDER — LEVOTHYROXINE SODIUM 0.1 MG/1
TABLET ORAL
Qty: 85 TABLET | Refills: 1 | Status: SHIPPED | OUTPATIENT
Start: 2020-06-19 | End: 2020-10-23 | Stop reason: SDUPTHER

## 2020-08-03 DIAGNOSIS — E20.9 HYPOPARATHYROIDISM, UNSPECIFIED HYPOPARATHYROIDISM TYPE (HCC): ICD-10-CM

## 2020-08-03 DIAGNOSIS — I49.8 POTS (POSTURAL ORTHOSTATIC TACHYCARDIA SYNDROME): ICD-10-CM

## 2020-08-04 RX ORDER — DEXTROAMPHETAMINE SULFATE 10 MG/1
10 CAPSULE, EXTENDED RELEASE ORAL DAILY
Qty: 90 CAPSULE | Refills: 0 | Status: SHIPPED | OUTPATIENT
Start: 2020-08-04 | End: 2020-10-23 | Stop reason: SDUPTHER

## 2020-08-04 RX ORDER — CALCITRIOL 0.25 UG/1
CAPSULE, LIQUID FILLED ORAL
Qty: 48 CAPSULE | Refills: 0 | Status: SHIPPED | OUTPATIENT
Start: 2020-08-04 | End: 2020-10-23 | Stop reason: SDUPTHER

## 2020-10-23 DIAGNOSIS — E20.9 HYPOPARATHYROIDISM, UNSPECIFIED HYPOPARATHYROIDISM TYPE (HCC): ICD-10-CM

## 2020-10-23 DIAGNOSIS — I49.8 POTS (POSTURAL ORTHOSTATIC TACHYCARDIA SYNDROME): ICD-10-CM

## 2020-10-23 DIAGNOSIS — E89.0 POSTOPERATIVE HYPOTHYROIDISM: ICD-10-CM

## 2020-10-23 RX ORDER — CALCITRIOL 0.25 UG/1
CAPSULE, LIQUID FILLED ORAL
Qty: 48 CAPSULE | Refills: 0 | Status: SHIPPED | OUTPATIENT
Start: 2020-10-23 | End: 2020-11-18 | Stop reason: SDUPTHER

## 2020-10-23 RX ORDER — LEVOTHYROXINE SODIUM 0.1 MG/1
TABLET ORAL
Qty: 85 TABLET | Refills: 0 | Status: SHIPPED | OUTPATIENT
Start: 2020-10-23 | End: 2020-11-18 | Stop reason: SDUPTHER

## 2020-10-23 RX ORDER — DEXTROAMPHETAMINE SULFATE 10 MG/1
10 CAPSULE, EXTENDED RELEASE ORAL DAILY
Qty: 30 CAPSULE | Refills: 0 | Status: SHIPPED | OUTPATIENT
Start: 2020-10-23 | End: 2020-11-18 | Stop reason: SDUPTHER

## 2020-11-13 ENCOUNTER — HOSPITAL ENCOUNTER (OUTPATIENT)
Dept: RADIOLOGY | Facility: CLINIC | Age: 65
Discharge: HOME | End: 2020-11-13
Payer: COMMERCIAL

## 2020-11-13 DIAGNOSIS — E20.9 HYPOPARATHYROIDISM, UNSPECIFIED HYPOPARATHYROIDISM TYPE (HCC): ICD-10-CM

## 2020-11-13 PROCEDURE — 77080 DXA BONE DENSITY AXIAL: CPT

## 2020-11-17 LAB
25(OH)D3+25(OH)D2 SERPL-MCNC: 85 NG/ML (ref 30–100)
ALBUMIN SERPL-MCNC: 4.3 G/DL (ref 3.8–4.8)
ALBUMIN/GLOB SERPL: 1.8 {RATIO} (ref 1.2–2.2)
ALP SERPL-CCNC: 74 IU/L (ref 39–117)
ALT SERPL-CCNC: 7 IU/L (ref 0–32)
AST SERPL-CCNC: 11 IU/L (ref 0–40)
BILIRUB SERPL-MCNC: 0.5 MG/DL (ref 0–1.2)
BUN SERPL-MCNC: 11 MG/DL (ref 8–27)
BUN/CREAT SERPL: 15 (ref 12–28)
CALCIUM SERPL-MCNC: 9.2 MG/DL (ref 8.7–10.3)
CHLORIDE SERPL-SCNC: 103 MMOL/L (ref 96–106)
CO2 SERPL-SCNC: 25 MMOL/L (ref 20–29)
CREAT SERPL-MCNC: 0.75 MG/DL (ref 0.57–1)
GLOBULIN SER-MCNC: 2.4 G/DL (ref 1.5–4.5)
GLUCOSE SERPL-MCNC: 83 MG/DL (ref 65–99)
POTASSIUM SERPL-SCNC: 4.2 MMOL/L (ref 3.5–5.2)
PROT SERPL-MCNC: 6.7 G/DL (ref 6–8.5)
PTH-INTACT SERPL-MCNC: 10 PG/ML (ref 15–65)
SL AMB EGFR AFRICAN AMERICAN: 97 ML/MIN/1.73
SL AMB EGFR NON AFRICAN AMERICAN: 85 ML/MIN/1.73
SODIUM SERPL-SCNC: 143 MMOL/L (ref 134–144)
T4 FREE SERPL-MCNC: 1.56 NG/DL (ref 0.82–1.77)
TSH SERPL DL<=0.005 MIU/L-ACNC: 3.85 UIU/ML (ref 0.45–4.5)

## 2020-11-18 ENCOUNTER — OFFICE VISIT (OUTPATIENT)
Dept: ENDOCRINOLOGY | Facility: HOSPITAL | Age: 65
End: 2020-11-18
Payer: COMMERCIAL

## 2020-11-18 VITALS
BODY MASS INDEX: 27.71 KG/M2 | HEIGHT: 62 IN | DIASTOLIC BLOOD PRESSURE: 62 MMHG | SYSTOLIC BLOOD PRESSURE: 106 MMHG | WEIGHT: 150.6 LBS | TEMPERATURE: 97.5 F | HEART RATE: 91 BPM

## 2020-11-18 DIAGNOSIS — E89.0 POSTOPERATIVE HYPOTHYROIDISM: Primary | ICD-10-CM

## 2020-11-18 DIAGNOSIS — I49.8 POTS (POSTURAL ORTHOSTATIC TACHYCARDIA SYNDROME): ICD-10-CM

## 2020-11-18 DIAGNOSIS — E20.9 HYPOPARATHYROIDISM, UNSPECIFIED HYPOPARATHYROIDISM TYPE (HCC): ICD-10-CM

## 2020-11-18 DIAGNOSIS — R53.83 FATIGUE, UNSPECIFIED TYPE: ICD-10-CM

## 2020-11-18 PROCEDURE — 99214 OFFICE O/P EST MOD 30 MIN: CPT | Performed by: PHYSICIAN ASSISTANT

## 2020-11-18 RX ORDER — CALCITRIOL 0.25 UG/1
CAPSULE, LIQUID FILLED ORAL
Qty: 54 CAPSULE | Refills: 1 | Status: SHIPPED | OUTPATIENT
Start: 2020-11-18 | End: 2021-05-14 | Stop reason: SDUPTHER

## 2020-11-18 RX ORDER — LEVOTHYROXINE SODIUM 0.1 MG/1
TABLET ORAL
Qty: 85 TABLET | Refills: 1 | Status: SHIPPED | OUTPATIENT
Start: 2020-11-18 | End: 2021-02-10 | Stop reason: SDUPTHER

## 2020-11-18 RX ORDER — DEXTROAMPHETAMINE SULFATE 15 MG/1
15 CAPSULE, EXTENDED RELEASE ORAL DAILY
Qty: 90 CAPSULE | Refills: 0 | Status: SHIPPED | OUTPATIENT
Start: 2020-11-18 | End: 2020-12-17 | Stop reason: SDUPTHER

## 2020-12-17 DIAGNOSIS — I49.8 POTS (POSTURAL ORTHOSTATIC TACHYCARDIA SYNDROME): ICD-10-CM

## 2020-12-17 RX ORDER — DEXTROAMPHETAMINE SULFATE 15 MG/1
15 CAPSULE, EXTENDED RELEASE ORAL DAILY
Qty: 90 CAPSULE | Refills: 0 | Status: SHIPPED | OUTPATIENT
Start: 2020-12-17 | End: 2021-02-10 | Stop reason: SDUPTHER

## 2021-01-01 LAB
BASOPHILS # BLD AUTO: 0.1 X10E3/UL (ref 0–0.2)
BASOPHILS NFR BLD AUTO: 1 %
EOSINOPHIL # BLD AUTO: 0.1 X10E3/UL (ref 0–0.4)
EOSINOPHIL NFR BLD AUTO: 1 %
ERYTHROCYTE [DISTWIDTH] IN BLOOD BY AUTOMATED COUNT: 12.1 % (ref 11.7–15.4)
FOLATE SERPL-MCNC: >20 NG/ML
HCT VFR BLD AUTO: 42.5 % (ref 34–46.6)
HGB BLD-MCNC: 14.4 G/DL (ref 11.1–15.9)
IMM GRANULOCYTES # BLD: 0 X10E3/UL (ref 0–0.1)
IMM GRANULOCYTES NFR BLD: 0 %
LYMPHOCYTES # BLD AUTO: 3.6 X10E3/UL (ref 0.7–3.1)
LYMPHOCYTES NFR BLD AUTO: 34 %
MCH RBC QN AUTO: 29.9 PG (ref 26.6–33)
MCHC RBC AUTO-ENTMCNC: 33.9 G/DL (ref 31.5–35.7)
MCV RBC AUTO: 88 FL (ref 79–97)
MONOCYTES # BLD AUTO: 0.6 X10E3/UL (ref 0.1–0.9)
MONOCYTES NFR BLD AUTO: 5 %
NEUTROPHILS # BLD AUTO: 6.3 X10E3/UL (ref 1.4–7)
NEUTROPHILS NFR BLD AUTO: 59 %
PLATELET # BLD AUTO: 283 X10E3/UL (ref 150–450)
RBC # BLD AUTO: 4.81 X10E6/UL (ref 3.77–5.28)
VIT B12 SERPL-MCNC: 1580 PG/ML (ref 232–1245)
WBC # BLD AUTO: 10.6 X10E3/UL (ref 3.4–10.8)

## 2021-02-10 DIAGNOSIS — E89.0 POSTOPERATIVE HYPOTHYROIDISM: ICD-10-CM

## 2021-02-10 DIAGNOSIS — I49.8 POTS (POSTURAL ORTHOSTATIC TACHYCARDIA SYNDROME): ICD-10-CM

## 2021-02-10 RX ORDER — DEXTROAMPHETAMINE SULFATE 15 MG/1
15 CAPSULE, EXTENDED RELEASE ORAL DAILY
Qty: 30 CAPSULE | Refills: 0 | Status: SHIPPED | OUTPATIENT
Start: 2021-02-10 | End: 2021-07-29 | Stop reason: SDUPTHER

## 2021-02-10 RX ORDER — LEVOTHYROXINE SODIUM 0.1 MG/1
TABLET ORAL
Qty: 30 TABLET | Refills: 11 | Status: SHIPPED | OUTPATIENT
Start: 2021-02-10 | End: 2021-07-29 | Stop reason: SDUPTHER

## 2021-03-09 ENCOUNTER — TELEPHONE (OUTPATIENT)
Dept: ENDOCRINOLOGY | Facility: HOSPITAL | Age: 66
End: 2021-03-09

## 2021-03-09 NOTE — TELEPHONE ENCOUNTER
Received information taht the Dextroamphetamine 15mg will need prior auth  Paperwork is from [facility]  We have [employer] on file for patient  Need to verify patient's insurance information to complete prior auth  Left message for call back

## 2021-03-10 ENCOUNTER — TELEPHONE (OUTPATIENT)
Dept: ENDOCRINOLOGY | Facility: HOSPITAL | Age: 66
End: 2021-03-10

## 2021-03-10 DIAGNOSIS — I49.8 POTS (POSTURAL ORTHOSTATIC TACHYCARDIA SYNDROME): Primary | ICD-10-CM

## 2021-03-10 RX ORDER — MIDODRINE HYDROCHLORIDE 10 MG/1
10 TABLET ORAL 3 TIMES DAILY
Qty: 90 TABLET | Refills: 5 | Status: SHIPPED | OUTPATIENT
Start: 2021-03-10

## 2021-03-10 NOTE — TELEPHONE ENCOUNTER
Spoke with patient  She notes she was on a medication with a beta blocker which made her shake and another medication that did not work  She does not know the name of them

## 2021-03-10 NOTE — TELEPHONE ENCOUNTER
Unfortunately the medication is an off-label use for her postural orthostatic tachycardia syndrome and the insurance company will not cover it  Did send over prescription for midodrine that will be taken 3 times a day  Will see if this helps with her symptoms

## 2021-04-09 ENCOUNTER — TELEPHONE (OUTPATIENT)
Dept: ENDOCRINOLOGY | Facility: HOSPITAL | Age: 66
End: 2021-04-09

## 2021-04-09 DIAGNOSIS — I49.8 POTS (POSTURAL ORTHOSTATIC TACHYCARDIA SYNDROME): Primary | ICD-10-CM

## 2021-04-09 NOTE — TELEPHONE ENCOUNTER
Those symptoms could be side effects of medication  At this point in time do not know what other medications we could try as she has had side effects with other medications  I did put in a referral to Cardiology

## 2021-04-09 NOTE — TELEPHONE ENCOUNTER
Spoke with patient  She is asking if the itching and tingling she is experiencing is normal, she states this was mentioned in the email   Can you place a cardiology referral?

## 2021-04-26 ENCOUNTER — TELEPHONE (OUTPATIENT)
Dept: OTHER | Facility: OTHER | Age: 66
End: 2021-04-26

## 2021-05-14 DIAGNOSIS — E20.9 HYPOPARATHYROIDISM, UNSPECIFIED HYPOPARATHYROIDISM TYPE (HCC): Primary | ICD-10-CM

## 2021-05-17 RX ORDER — CALCITRIOL 0.25 UG/1
CAPSULE, LIQUID FILLED ORAL
Qty: 54 CAPSULE | Refills: 3 | Status: SHIPPED | OUTPATIENT
Start: 2021-05-17 | End: 2022-05-05

## 2021-07-09 LAB
25(OH)D3+25(OH)D2 SERPL-MCNC: 55.6 NG/ML (ref 30–100)
PHOSPHATE SERPL-MCNC: 3.9 MG/DL (ref 3–4.3)
PTH-INTACT SERPL-MCNC: 14 PG/ML (ref 15–65)
T4 FREE SERPL-MCNC: 1.81 NG/DL (ref 0.82–1.77)
TSH SERPL DL<=0.005 MIU/L-ACNC: 1.94 UIU/ML (ref 0.45–4.5)

## 2021-07-29 ENCOUNTER — OFFICE VISIT (OUTPATIENT)
Dept: ENDOCRINOLOGY | Facility: HOSPITAL | Age: 66
End: 2021-07-29
Payer: COMMERCIAL

## 2021-07-29 VITALS
BODY MASS INDEX: 26.43 KG/M2 | HEART RATE: 92 BPM | HEIGHT: 62 IN | DIASTOLIC BLOOD PRESSURE: 64 MMHG | SYSTOLIC BLOOD PRESSURE: 100 MMHG | WEIGHT: 143.6 LBS

## 2021-07-29 DIAGNOSIS — E20.9 HYPOPARATHYROIDISM, UNSPECIFIED HYPOPARATHYROIDISM TYPE (HCC): ICD-10-CM

## 2021-07-29 DIAGNOSIS — I49.8 POTS (POSTURAL ORTHOSTATIC TACHYCARDIA SYNDROME): ICD-10-CM

## 2021-07-29 DIAGNOSIS — E89.0 POSTOPERATIVE HYPOTHYROIDISM: Primary | ICD-10-CM

## 2021-07-29 PROCEDURE — 99214 OFFICE O/P EST MOD 30 MIN: CPT | Performed by: PHYSICIAN ASSISTANT

## 2021-07-29 RX ORDER — DEXTROAMPHETAMINE SULFATE 15 MG/1
15 CAPSULE, EXTENDED RELEASE ORAL DAILY
Qty: 30 CAPSULE | Refills: 0 | Status: SHIPPED | OUTPATIENT
Start: 2021-07-29 | End: 2021-08-30 | Stop reason: SDUPTHER

## 2021-07-29 RX ORDER — LEVOTHYROXINE SODIUM 0.1 MG/1
TABLET ORAL
Qty: 30 TABLET | Refills: 11 | Status: SHIPPED | OUTPATIENT
Start: 2021-07-29 | End: 2022-01-19 | Stop reason: SDUPTHER

## 2021-07-29 NOTE — PATIENT INSTRUCTIONS
Continue with levothyroxine 100 mcg 6 days a week and no tablet on Sunday      Continue with Calcitrol 0 25 mcg 4 days a week      Continue with calcium and vitamin-D supplement      Follow-up in 6 months with lab work completed prior to visit

## 2021-07-29 NOTE — PROGRESS NOTES
Jessica Dhillon 73 y o  female MRN: [id number]    Encounter: [id number]      Assessment/Plan     Assessment: This is a 73y o -year-old female with postoperative hypothyroidism and hypoparathyroidism  Plan:  1  Hypothyroidism:  Most recent thyroid lab work came back showing that she is hypothyroid at this time  I would recommend decreasing her levothyroxine 100 mcg to 1 tablet 6 days a week and no tablet on Sundays  Will recheck thyroid function in 6 weeks  Contact the office if there is any change in symptoms  Follow-up in 6 months with lab work completed prior to visit      2  Hypoparathyroidism:  Lab work came back normal   Continue with calcitriol, vitamin-D and calcium supplements       3  Postural orthostatic tachycardia syndrome: That has been taking Dexedrine for several years  It was not covered by her insurance and was switched to ProAmatine 10 mg 3 times a day  She states that she is having side effects with this medication including fatigue, formication, and pruritus  She is willing to pay for the Dexedrine out-of-pocket  Once again advised her to follow up with Cardiology to make sure she is receiving the appropriate care  CC:   Hypothyroidism and hypoparathyroidism follow-up    History of Present Illness     HPI:  Carlos Denny a 60 y  o  female with hypothyroidism who presents for a follow-up appointment  Richard Tariq has a history of goiter and thyroid nodules that prompted thyroidectomy for neck compressive symptoms in [facility] in February 2017   Surgical pathology in February 2017 did reveal an 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   One normal cellular parathyroid gland 1 benign lymph node   She was treated for postoperative hypothyroidism with levothyroxine 100 mcg daily but only a half tab on Sundays   She also has postsurgical hypoparathyroidism and is maintained on calcitriol 0 25 mcg on Sunday, Tuesday, Thursday, Saturday as well as calcium 750 mg every day with vitamin-D  States that she is occasionally getting muscle cramps   She also reportedly has a history of POTS and has been maintained on Dexedrine for years by prior endocrinologist  This was not being covered by her insurance, and she was switched to [employer]  She has concerns of abnormal skin sensation, increased fatigue, and itchy scalp while on this medication  States she has a hard time taking it 3 times a day  States over the last few months her weight has been more stable  Has not been doing any diet or exercise  States that she also gets a pins and needle sensation in her lower extremities at night  Has not been experiencing any palpitations, abdominal pain, diarrhea, constipation  Does have brittle hair and fingernails  Review of Systems   Constitutional: Positive for fatigue  Negative for activity change, appetite change, diaphoresis and unexpected weight change  HENT: Negative for sore throat, trouble swallowing and voice change  Eyes: Negative for visual disturbance  Respiratory: Negative for chest tightness and shortness of breath  Cardiovascular: Negative for chest pain, palpitations and leg swelling  Gastrointestinal: Negative for abdominal pain, constipation, diarrhea and nausea  Endocrine: Negative for cold intolerance, heat intolerance, polydipsia, polyphagia and polyuria  Skin: Negative for rash  Pruritus and formication   Neurological: Negative for dizziness, tremors, light-headedness, numbness and headaches  Hematological: Negative for adenopathy  Psychiatric/Behavioral: Negative for dysphoric mood and sleep disturbance  The patient is not nervous/anxious  Historical Information   No past medical history on file  No past surgical history on file    Social History   Social History     Substance and Sexual Activity   Alcohol Use None     Social History     Substance and Sexual Activity   Drug Use Not on file     Social History     Tobacco Use Smoking Status Current Every Day Smoker    Packs/day: 0 33    Types: Cigarettes    Start date: 80   Smokeless Tobacco Never Used     Family History:   Family History   Problem Relation Age of Onset    Arthritis Mother     Melanoma Mother     Cancer Mother     Hypertension Mother     Colon cancer Father     Heart attack Brother     Hypertension Brother        Meds/Allergies   Current Outpatient Medications   Medication Sig Dispense Refill    Ascorbic Acid (VITAMIN C) 500 MG CHEW Chew 500 mg daily      Biotin 5000 MCG CAPS Take 1 capsule by mouth daily      calcitriol (ROCALTROL) 0 25 mcg capsule 1 capsule Sunday, Tuesday, Thursday, and Saturday  54 capsule 3    Calcium Carbonate-Vitamin D (CALCIUM 600+D PO) Take by mouth      Cholecalciferol (VITAMIN D3) 5000 units CAPS Take by mouth      cyanocobalamin (VITAMIN B-12) 1,000 mcg tablet Take 1,000 mcg by mouth Monday, Wednesday, and Friday   fluticasone (FLONASE) 50 mcg/act nasal spray 1 spray ech nostril 1 Bottle 5    folic acid (FOLVITE) 1 mg tablet Take 1 mg by mouth daily      levothyroxine 100 mcg tablet TAKE 1 TABLET BY MOUTH 6  DAYS OF THE WEEK AND 1/2  TABLET ON SUNDAYS 30 tablet 11    midodrine (PROAMATINE) 10 MG tablet Take 1 tablet (10 mg total) by mouth 3 (three) times a day 90 tablet 5    dextroamphetamine (DEXEDRINE SPANSULE) 15 MG 24 hr capsule Take 1 capsule (15 mg total) by mouth dailyMax Daily Amount: 15 mg (Patient not taking: Reported on 7/29/2021) 30 capsule 0    Krill Oil 300 MG CAPS Take 1 capsule by mouth daily (Patient not taking: Reported on 7/29/2021)      Melatonin 10 MG TBDP Take by mouth (Patient not taking: Reported on 7/29/2021)       No current facility-administered medications for this visit       Allergies   Allergen Reactions    Other Shortness Of Breath and Swelling     Allergy to bee stings    Penicillins Other (See Comments)     Reaction unknown       Objective   Vitals: Blood pressure 100/64, pulse 92, height 5' 2" (1 575 m), weight 65 1 kg (143 lb 9 6 oz)  Physical Exam  Vitals and nursing note reviewed  Constitutional:       General: She is not in acute distress  Appearance: Normal appearance  She is not diaphoretic  HENT:      Head: Normocephalic and atraumatic  Eyes:      General: No scleral icterus  Extraocular Movements: Extraocular movements intact  Conjunctiva/sclera: Conjunctivae normal       Pupils: Pupils are equal, round, and reactive to light  Cardiovascular:      Rate and Rhythm: Normal rate and regular rhythm  Heart sounds: No murmur heard  Pulmonary:      Effort: Pulmonary effort is normal  No respiratory distress  Breath sounds: Normal breath sounds  No wheezing  Musculoskeletal:      Cervical back: Normal range of motion  Right lower leg: No edema  Left lower leg: No edema  Lymphadenopathy:      Cervical: No cervical adenopathy  Neurological:      Mental Status: She is alert and oriented to person, place, and time  Mental status is at baseline  Sensory: No sensory deficit  Motor: No tremor  Gait: Gait normal       Deep Tendon Reflexes:      Reflex Scores:       Patellar reflexes are 2+ on the right side and 2+ on the left side  Psychiatric:         Mood and Affect: Mood normal          Behavior: Behavior normal          Thought Content: Thought content normal          The history was obtained from the review of the chart, patient  Lab Results:   Lab Results   Component Value Date/Time    Free t4 1 81 (H) 07/08/2021 11:19 AM    Free t4 1 56 11/16/2020 09:24 AM       Portions of the record may have been created with voice recognition software  Occasional wrong word or "sound a like" substitutions may have occurred due to the inherent limitations of voice recognition software  Read the chart carefully and recognize, using context, where substitutions have occurred

## 2021-08-04 ENCOUNTER — TELEPHONE (OUTPATIENT)
Dept: ADMINISTRATIVE | Facility: OTHER | Age: 66
End: 2021-08-04

## 2021-08-04 NOTE — TELEPHONE ENCOUNTER
----- Message from [facility] sent at 7/29/2021  9:49 AM EDT -----  Regarding: CRC  07/29/21 9:50 AM    Hello, our patient Rachelle Ku has had a Colonoscopy through [facility]  She gets them every 5 years     Thank you,  [address] [place] [address] [facility] Agnieszka Collins

## 2021-08-04 NOTE — LETTER
Procedure Request Form: Colonoscopy      Date Requested: 21  Patient: Lonita [place]  Patient : [id number]   Referring Provider: Erik Gell        Date of Procedure ______________________________       The above patient has informed us that they have completed their   most recent Colonoscopy at your facility  Please complete   this form and attach all corresponding procedure reports/results  Comments __________________________________________________________  ____________________________________________________________________  ____________________________________________________________________  ____________________________________________________________________    Facility Completing Procedure _________________________________________    Form Completed By (print name) _______________________________________      Signature __________________________________________________________      These reports are needed for  compliance    Please fax this completed form and a copy of the procedure report to our office located at [place] [address] as soon as possible to [phone] anabela Mathews: Phone [phone]    We thank you for your assistance in treating our mutual patient

## 2021-08-04 NOTE — TELEPHONE ENCOUNTER
Upon review of the In Basket request and the patient's chart, initial outreach has been made via fax, please see Contacts section for details         [phone]    Thank you  Ayla Knight MA

## 2021-08-05 ENCOUNTER — TELEPHONE (OUTPATIENT)
Dept: FAMILY MEDICINE CLINIC | Facility: CLINIC | Age: 66
End: 2021-08-05

## 2021-08-10 NOTE — TELEPHONE ENCOUNTER
Upon review of the In Basket request we have found that we are unable to obtain a copy of the exclusion documentation requested because [facility] needs a release of records form signed by patient    Any additional questions or concerns should be emailed to the Practice Liaisons via [email or web address] email, please do not reply via In Basket      Thank you  Oscar Garcia MA

## 2021-08-30 ENCOUNTER — TELEPHONE (OUTPATIENT)
Dept: ENDOCRINOLOGY | Facility: HOSPITAL | Age: 66
End: 2021-08-30

## 2021-08-30 NOTE — TELEPHONE ENCOUNTER
Patient called for a refill of her Dextroamphetamine  I wasn't sure if this was ok to fill, so I did not set it up  She wants it sent to the [facility] on file  She is out of her medication

## 2021-09-02 DIAGNOSIS — I49.8 POTS (POSTURAL ORTHOSTATIC TACHYCARDIA SYNDROME): ICD-10-CM

## 2021-09-02 RX ORDER — DEXTROAMPHETAMINE SULFATE 15 MG/1
15 CAPSULE, EXTENDED RELEASE ORAL DAILY
Qty: 30 CAPSULE | Refills: 0 | Status: SHIPPED | OUTPATIENT
Start: 2021-09-02 | End: 2021-09-30 | Stop reason: SDUPTHER

## 2021-09-24 DIAGNOSIS — I49.8 POTS (POSTURAL ORTHOSTATIC TACHYCARDIA SYNDROME): ICD-10-CM

## 2021-09-24 RX ORDER — DEXTROAMPHETAMINE SULFATE 15 MG/1
15 CAPSULE, EXTENDED RELEASE ORAL DAILY
Qty: 90 CAPSULE | Refills: 0 | OUTPATIENT
Start: 2021-09-24

## 2021-09-30 DIAGNOSIS — I49.8 POTS (POSTURAL ORTHOSTATIC TACHYCARDIA SYNDROME): ICD-10-CM

## 2021-10-01 RX ORDER — DEXTROAMPHETAMINE SULFATE 15 MG/1
15 CAPSULE, EXTENDED RELEASE ORAL DAILY
Qty: 30 CAPSULE | Refills: 0 | Status: SHIPPED | OUTPATIENT
Start: 2021-10-01 | End: 2021-11-01 | Stop reason: SDUPTHER

## 2021-11-01 DIAGNOSIS — I49.8 POTS (POSTURAL ORTHOSTATIC TACHYCARDIA SYNDROME): ICD-10-CM

## 2021-11-01 RX ORDER — DEXTROAMPHETAMINE SULFATE 15 MG/1
15 CAPSULE, EXTENDED RELEASE ORAL DAILY
Qty: 30 CAPSULE | Refills: 0 | Status: SHIPPED | OUTPATIENT
Start: 2021-11-01 | End: 2021-12-01 | Stop reason: SDUPTHER

## 2021-12-01 DIAGNOSIS — I49.8 POTS (POSTURAL ORTHOSTATIC TACHYCARDIA SYNDROME): ICD-10-CM

## 2021-12-01 RX ORDER — DEXTROAMPHETAMINE SULFATE 15 MG/1
15 CAPSULE, EXTENDED RELEASE ORAL DAILY
Qty: 30 CAPSULE | Refills: 0 | Status: SHIPPED | OUTPATIENT
Start: 2021-12-01 | End: 2021-12-28 | Stop reason: SDUPTHER

## 2021-12-28 DIAGNOSIS — I49.8 POTS (POSTURAL ORTHOSTATIC TACHYCARDIA SYNDROME): ICD-10-CM

## 2021-12-28 RX ORDER — DEXTROAMPHETAMINE SULFATE 15 MG/1
15 CAPSULE, EXTENDED RELEASE ORAL DAILY
Qty: 30 CAPSULE | Refills: 0 | Status: SHIPPED | OUTPATIENT
Start: 2021-12-28 | End: 2022-01-28 | Stop reason: SDUPTHER

## 2022-01-18 LAB
25(OH)D3+25(OH)D2 SERPL-MCNC: 65.5 NG/ML (ref 30–100)
ALBUMIN SERPL-MCNC: 4.3 G/DL (ref 3.8–4.8)
ALBUMIN/GLOB SERPL: 1.8 {RATIO} (ref 1.2–2.2)
ALP SERPL-CCNC: 75 IU/L (ref 44–121)
ALT SERPL-CCNC: 7 IU/L (ref 0–32)
AST SERPL-CCNC: 14 IU/L (ref 0–40)
BILIRUB SERPL-MCNC: 0.4 MG/DL (ref 0–1.2)
BUN SERPL-MCNC: 15 MG/DL (ref 8–27)
BUN/CREAT SERPL: 18 (ref 12–28)
CALCIUM SERPL-MCNC: 9.2 MG/DL (ref 8.7–10.3)
CHLORIDE SERPL-SCNC: 102 MMOL/L (ref 96–106)
CO2 SERPL-SCNC: 24 MMOL/L (ref 20–29)
CREAT SERPL-MCNC: 0.83 MG/DL (ref 0.57–1)
GLOBULIN SER-MCNC: 2.4 G/DL (ref 1.5–4.5)
GLUCOSE SERPL-MCNC: 81 MG/DL (ref 65–99)
PHOSPHATE SERPL-MCNC: 4.1 MG/DL (ref 3–4.3)
POTASSIUM SERPL-SCNC: 3.9 MMOL/L (ref 3.5–5.2)
PROT SERPL-MCNC: 6.7 G/DL (ref 6–8.5)
PTH-INTACT SERPL-MCNC: 7 PG/ML (ref 15–65)
SL AMB EGFR AFRICAN AMERICAN: 86 ML/MIN/1.73
SL AMB EGFR NON AFRICAN AMERICAN: 74 ML/MIN/1.73
SODIUM SERPL-SCNC: 141 MMOL/L (ref 134–144)
T4 FREE SERPL-MCNC: 1.4 NG/DL (ref 0.82–1.77)
TSH SERPL DL<=0.005 MIU/L-ACNC: 4.96 UIU/ML (ref 0.45–4.5)

## 2022-01-19 ENCOUNTER — OFFICE VISIT (OUTPATIENT)
Dept: ENDOCRINOLOGY | Facility: HOSPITAL | Age: 67
End: 2022-01-19
Payer: COMMERCIAL

## 2022-01-19 VITALS
BODY MASS INDEX: 28.89 KG/M2 | SYSTOLIC BLOOD PRESSURE: 130 MMHG | HEART RATE: 92 BPM | HEIGHT: 62 IN | DIASTOLIC BLOOD PRESSURE: 78 MMHG | WEIGHT: 157 LBS

## 2022-01-19 DIAGNOSIS — E89.0 POSTOPERATIVE HYPOTHYROIDISM: Primary | ICD-10-CM

## 2022-01-19 DIAGNOSIS — E20.9 HYPOPARATHYROIDISM, UNSPECIFIED HYPOPARATHYROIDISM TYPE (HCC): ICD-10-CM

## 2022-01-19 PROCEDURE — 99214 OFFICE O/P EST MOD 30 MIN: CPT | Performed by: NURSE PRACTITIONER

## 2022-01-19 RX ORDER — LEVOTHYROXINE SODIUM 0.1 MG/1
TABLET ORAL
Qty: 30 TABLET | Refills: 11 | Status: SHIPPED | OUTPATIENT
Start: 2022-01-19 | End: 2022-07-18 | Stop reason: SDUPTHER

## 2022-01-19 NOTE — PROGRESS NOTES
Vandana Alexis 73 y o  female MRN: [id number]    Encounter: [id number]      Assessment/Plan     Assessment: This is a 73y o -year-old female with postoperative hypothyroidism and hypoparathyroidism       Plan:  1   Postoperative hypothyroidism:   Generally, she feels well   Her most recent TSH is elevated to 4 960   I have asked her to resume taking her levothyroxine 100 mcg daily Monday through Saturday with 1/2 tablet on Sunday  Recheck TSH and free T4 in 6 weeks to reassess   Titration of her levothyroxine dose may take place upon review of updated lab work      2   Hypoparathyroidism:  She is currently taking on calcitriol 0 25 mcg daily and calcium 600 mg once a daily    Her most recent calcium level is normal   For now, she will continue Calcitriol 0 25 mcg every other day with calcium   She will contact the office if she develops any worsening muscle cramping, numbness, tingling, perioral paresthesias or difficulty breathing  CC: Hypothyroidism/hypoparathyroidism follow-up    History of Present Illness     HPI:  66 y  o  year old female with history of hypothyroidism who presents for follow-up  She had a history of goiter and thyroid nodules reportedly that eventually underwent surgery for neck compressive symptoms at [facility] in February 2017   She then was treated for postoperative hypothyroidism levothyroxine 100 mcg daily with an no tablet on Sunday   Her most recent TSH from  January 17, 2022 is 4 960 with a free T4 of 1 40    Overall, she feels well but notes chronic joint aches, pains, muscle cramping at times    She denies any anterior neck discomfort, dysphagia or dysphonia        She is also taking calcitriol 0 25 mcg on Sunday, Tuesday, Thursday and Saturday, as well as, calcium 600 mg every evening with vitamin-D daily along with 5000 units of vitamin-D daily for postoperative hypoparathyroidism  Tacos Hernandez most recent calcium from January 17, 2022 is 9 2 with an albumin of 4 3 and PTH was not resulted at the time of the visit  Recent phosphorus  Level was 4 1   Her vitamin-D level from January 17, 2022 is 65 5  She denies any aresthesias, perioral paresthesias or shortness of breath        Review of Systems   Constitutional: Positive for fatigue (at times)  Negative for chills and fever  HENT: Negative  Negative for trouble swallowing and voice change  Eyes: Negative  Respiratory: Negative  Negative for chest tightness and shortness of breath  Cardiovascular: Negative  Negative for chest pain  Gastrointestinal: Negative  Negative for abdominal pain, constipation, diarrhea and vomiting  Endocrine: Positive for cold intolerance  Genitourinary: Negative  Musculoskeletal: Positive for arthralgias (hands at times)  Skin: Negative  Allergic/Immunologic: Negative  Neurological: Negative  Negative for dizziness, syncope, light-headedness and headaches  Hematological: Negative  Psychiatric/Behavioral: Negative  All other systems reviewed and are negative  Historical Information   No past medical history on file  No past surgical history on file    Social History   Social History     Substance and Sexual Activity   Alcohol Use None     Social History     Substance and Sexual Activity   Drug Use Not on file     Social History     Tobacco Use   Smoking Status Current Every Day Smoker    Packs/day: 0 33    Types: Cigarettes    Start date: 1988   Smokeless Tobacco Never Used     Family History:   Family History   Problem Relation Age of Onset    Arthritis Mother     Melanoma Mother     Cancer Mother     Hypertension Mother     Colon cancer Father     Heart attack Brother     Hypertension Brother        Meds/Allergies   Current Outpatient Medications   Medication Sig Dispense Refill    Ascorbic Acid (VITAMIN C) 500 MG CHEW Chew 500 mg daily      Biotin 5000 MCG CAPS Take 1 capsule by mouth daily      calcitriol (ROCALTROL) 0 25 mcg capsule 1 capsule Sunday, Tuesday, Thursday, and Saturday  54 capsule 3    Calcium Carbonate-Vitamin D (CALCIUM 600+D PO) Take by mouth      Cholecalciferol (VITAMIN D3) 5000 units CAPS Take by mouth      cyanocobalamin (VITAMIN B-12) 1,000 mcg tablet Take 1,000 mcg by mouth Monday, Wednesday, and Friday   dextroamphetamine (DEXEDRINE SPANSULE) 15 MG 24 hr capsule Take 1 capsule (15 mg total) by mouth daily Max Daily Amount: 15 mg 30 capsule 0    fluticasone (FLONASE) 50 mcg/act nasal spray 1 spray ech nostril 1 Bottle 5    folic acid (FOLVITE) 1 mg tablet Take 1 mg by mouth daily      Krill Oil 300 MG CAPS Take 1 capsule by mouth daily (Patient not taking: Reported on 7/29/2021)      levothyroxine 100 mcg tablet TAKE 1 TABLET BY MOUTH 6  DAYS OF THE WEEK AND NO TAB ON SUNDAY 30 tablet 11    Melatonin 10 MG TBDP Take by mouth (Patient not taking: Reported on 7/29/2021)      midodrine (PROAMATINE) 10 MG tablet Take 1 tablet (10 mg total) by mouth 3 (three) times a day 90 tablet 5     No current facility-administered medications for this visit  Allergies   Allergen Reactions    Other Shortness Of Breath and Swelling     Allergy to bee stings    Penicillins Other (See Comments)     Reaction unknown       Objective   Vitals: Height 5' 2" (1 575 m)  Physical Exam  Vitals reviewed  Constitutional:       Appearance: She is well-developed  She is obese  HENT:      Head: Normocephalic and atraumatic  Eyes:      Conjunctiva/sclera: Conjunctivae normal       Pupils: Pupils are equal, round, and reactive to light  Comments: Wears glasses   Cardiovascular:      Rate and Rhythm: Normal rate and regular rhythm  Heart sounds: Normal heart sounds  Pulmonary:      Effort: Pulmonary effort is normal       Breath sounds: Normal breath sounds  Abdominal:      General: Bowel sounds are normal       Palpations: Abdomen is soft  Musculoskeletal:         General: Normal range of motion        Cervical back: Normal range of motion and neck supple  Skin:     General: Skin is warm and dry  Neurological:      Mental Status: She is alert and oriented to person, place, and time  Psychiatric:         Behavior: Behavior normal          Thought Content: Thought content normal          Judgment: Judgment normal        Lab Results:   Lab Results   Component Value Date/Time    Potassium 3 9 01/17/2022 08:53 AM    Chloride 102 01/17/2022 08:53 AM    CO2 24 01/17/2022 08:53 AM    BUN 15 01/17/2022 08:53 AM    Creatinine 0 83 01/17/2022 08:53 AM    Free t4 1 40 01/17/2022 08:53 AM    Free t4 1 81 (H) 07/08/2021 11:19 AM     Imaging Studies:         Results for orders placed during the hospital encounter of 11/13/20    DXA bone density spine hip and pelvis    Impression  1  Low bone mass (osteopenia)  [Based on the left femoral neck]    2  The 10 year risk of hip fracture is 2 2% with the 10 year risk of major osteoporotic fracture being 10% as calculated by the [facility]/WHO fracture risk assessment tool (FRAX)  3   The current NOF guidelines recommend treating patients with a T-score of -2 5 or less in the lumbar spine or hips, or in post-menopausal women and men over the age of 48 with low bone mass (osteopenia) and a FRAX 10 year risk score of >3% for hip  fracture and/or >20% for major osteoporotic fracture  4   The NOF recommends follow-up DXA in 1-2 years after initiating therapy for osteoporosis and every 2 years thereafter  More frequent evaluation is appropriate for patients with conditions associated with rapid bone loss, such as glucocorticoid  therapy  The interval between DXA screenings may be longer for individuals without major risk factors and initial T-score in the normal or upper low bone mass range  The FRAX algorithm has certain limitations:  -FRAX has not been validated in patients currently or previously treated with pharmacotherapy for osteoporosis    In such patients, clinical judgment must be exercised in interpreting FRAX scores  -Prior hip, vertebral and humeral fragility fractures appear to confer greater risk of subsequent fracture than fractures at other sites (this is especially true for individuals with severe vertebral fractures), but quantification of this incremental  risk is not possible with FRAX  -FRAX underestimates fracture risk in patients with history of multiple fragility fractures  -FRAX may underestimate fracture risk in patients with history of frequent falls   -It is not appropriate to use FRAX to monitor treatment response  WHO CLASSIFICATION:  Normal (a T-score of -1 0 or higher)  Low bone mineral density (a T-score of less than -1 0 but higher than -2 5)  Osteoporosis (a T-score of -2 5 or less)  Severe osteoporosis (a T-score of -2 5 or less with a fragility fracture)              Workstation performed: [id number]      Portions of the record may have been created with voice recognition software  Occasional wrong word or "sound a like" substitutions may have occurred due to the inherent limitations of voice recognition software  Read the chart carefully and recognize, using context, where substitutions have occurred

## 2022-01-19 NOTE — PATIENT INSTRUCTIONS
Continue with levothyroxine 100 mcg 6 days a week and take 1/2 tablet on Sunday  Recheck thyroid lab work in 6 weeks to reassess      Continue with Calcitrol 0 25 mcg - 4 days a week      Continue with calcium and vitamin-D supplement      Follow-up in 6 months with lab work completed prior to visit

## 2022-01-28 DIAGNOSIS — I49.8 POTS (POSTURAL ORTHOSTATIC TACHYCARDIA SYNDROME): ICD-10-CM

## 2022-01-28 RX ORDER — DEXTROAMPHETAMINE SULFATE 15 MG/1
15 CAPSULE, EXTENDED RELEASE ORAL DAILY
Qty: 30 CAPSULE | Refills: 0 | Status: SHIPPED | OUTPATIENT
Start: 2022-01-28 | End: 2022-03-07 | Stop reason: SDUPTHER

## 2022-03-07 DIAGNOSIS — I49.8 POTS (POSTURAL ORTHOSTATIC TACHYCARDIA SYNDROME): ICD-10-CM

## 2022-03-08 RX ORDER — DEXTROAMPHETAMINE SULFATE 15 MG/1
15 CAPSULE, EXTENDED RELEASE ORAL DAILY
Qty: 30 CAPSULE | Refills: 0 | Status: SHIPPED | OUTPATIENT
Start: 2022-03-08 | End: 2022-04-01 | Stop reason: SDUPTHER

## 2022-04-01 DIAGNOSIS — I49.8 POTS (POSTURAL ORTHOSTATIC TACHYCARDIA SYNDROME): ICD-10-CM

## 2022-04-01 RX ORDER — DEXTROAMPHETAMINE SULFATE 15 MG/1
15 CAPSULE, EXTENDED RELEASE ORAL DAILY
Qty: 30 CAPSULE | Refills: 0 | Status: SHIPPED | OUTPATIENT
Start: 2022-04-01 | End: 2022-05-13 | Stop reason: SDUPTHER

## 2022-05-05 DIAGNOSIS — E20.9 HYPOPARATHYROIDISM, UNSPECIFIED HYPOPARATHYROIDISM TYPE (HCC): ICD-10-CM

## 2022-05-05 RX ORDER — CALCITRIOL 0.25 UG/1
CAPSULE, LIQUID FILLED ORAL
Qty: 54 CAPSULE | Refills: 3 | Status: SHIPPED | OUTPATIENT
Start: 2022-05-05 | End: 2022-07-18 | Stop reason: SDUPTHER

## 2022-05-13 ENCOUNTER — TELEPHONE (OUTPATIENT)
Dept: OTHER | Facility: HOSPITAL | Age: 67
End: 2022-05-13

## 2022-05-13 DIAGNOSIS — I49.8 POTS (POSTURAL ORTHOSTATIC TACHYCARDIA SYNDROME): Primary | ICD-10-CM

## 2022-05-13 RX ORDER — DEXTROAMPHETAMINE SULFATE 15 MG/1
15 CAPSULE, EXTENDED RELEASE ORAL DAILY
Qty: 30 CAPSULE | Refills: 0 | Status: SHIPPED | OUTPATIENT
Start: 2022-05-13 | End: 2022-05-19 | Stop reason: SDUPTHER

## 2022-05-18 DIAGNOSIS — I49.8 POTS (POSTURAL ORTHOSTATIC TACHYCARDIA SYNDROME): Primary | ICD-10-CM

## 2022-05-19 DIAGNOSIS — I49.8 POTS (POSTURAL ORTHOSTATIC TACHYCARDIA SYNDROME): ICD-10-CM

## 2022-05-19 RX ORDER — DEXTROAMPHETAMINE SULFATE 15 MG/1
15 CAPSULE, EXTENDED RELEASE ORAL DAILY
Qty: 30 CAPSULE | Refills: 0 | OUTPATIENT
Start: 2022-05-19

## 2022-05-20 DIAGNOSIS — I49.8 POTS (POSTURAL ORTHOSTATIC TACHYCARDIA SYNDROME): ICD-10-CM

## 2022-05-20 RX ORDER — DEXTROAMPHETAMINE SULFATE 15 MG/1
15 CAPSULE, EXTENDED RELEASE ORAL DAILY
Qty: 30 CAPSULE | Refills: 0 | Status: SHIPPED | OUTPATIENT
Start: 2022-05-20 | End: 2022-06-16 | Stop reason: SDUPTHER

## 2022-05-20 RX ORDER — DEXTROAMPHETAMINE SULFATE 15 MG/1
15 CAPSULE, EXTENDED RELEASE ORAL DAILY
Qty: 30 CAPSULE | Refills: 0 | Status: SHIPPED | OUTPATIENT
Start: 2022-05-20 | End: 2022-05-20 | Stop reason: SDUPTHER

## 2022-05-20 NOTE — TELEPHONE ENCOUNTER
RECEIVED CALL FROM PHARMACY, THEY ARE NOT ABLE TO USE [facility] TO HAVE THIS FILLED  PLEASE ORDER  SCRIPT EARLIER IN THE WEEK WAS SENT TO THE WRONG PHARMACY

## 2022-06-16 DIAGNOSIS — I49.8 POTS (POSTURAL ORTHOSTATIC TACHYCARDIA SYNDROME): ICD-10-CM

## 2022-06-16 RX ORDER — DEXTROAMPHETAMINE SULFATE 15 MG/1
15 CAPSULE, EXTENDED RELEASE ORAL DAILY
Qty: 30 CAPSULE | Refills: 0 | Status: SHIPPED | OUTPATIENT
Start: 2022-06-16 | End: 2022-06-17 | Stop reason: SDUPTHER

## 2022-06-17 DIAGNOSIS — I49.8 POTS (POSTURAL ORTHOSTATIC TACHYCARDIA SYNDROME): ICD-10-CM

## 2022-06-17 DIAGNOSIS — I49.8 POTS (POSTURAL ORTHOSTATIC TACHYCARDIA SYNDROME): Primary | ICD-10-CM

## 2022-06-17 RX ORDER — DEXTROAMPHETAMINE SULFATE 15 MG/1
15 CAPSULE, EXTENDED RELEASE ORAL DAILY
Qty: 30 CAPSULE | Refills: 0 | Status: SHIPPED | OUTPATIENT
Start: 2022-06-17 | End: 2022-06-17 | Stop reason: SDUPTHER

## 2022-06-17 RX ORDER — DEXTROAMPHETAMINE SULFATE 15 MG/1
15 CAPSULE, EXTENDED RELEASE ORAL DAILY
Qty: 30 CAPSULE | Refills: 0 | Status: SHIPPED | OUTPATIENT
Start: 2022-06-17 | End: 2022-07-18 | Stop reason: SDUPTHER

## 2022-06-17 NOTE — TELEPHONE ENCOUNTER
Patient needs a refill and per the pharmacy it must be prescribed by a physician  Can you please send?

## 2022-07-18 DIAGNOSIS — G90.A POTS (POSTURAL ORTHOSTATIC TACHYCARDIA SYNDROME): ICD-10-CM

## 2022-07-18 DIAGNOSIS — E89.0 POSTOPERATIVE HYPOTHYROIDISM: ICD-10-CM

## 2022-07-18 DIAGNOSIS — E20.9 HYPOPARATHYROIDISM, UNSPECIFIED HYPOPARATHYROIDISM TYPE (HCC): ICD-10-CM

## 2022-07-19 LAB
25(OH)D3+25(OH)D2 SERPL-MCNC: 49.5 NG/ML (ref 30–100)
ALBUMIN SERPL-MCNC: 4.1 G/DL (ref 3.8–4.8)
ALBUMIN/GLOB SERPL: 1.8 {RATIO} (ref 1.2–2.2)
ALP SERPL-CCNC: 80 IU/L (ref 44–121)
ALT SERPL-CCNC: 6 IU/L (ref 0–32)
AST SERPL-CCNC: 14 IU/L (ref 0–40)
BILIRUB SERPL-MCNC: 0.4 MG/DL (ref 0–1.2)
BUN SERPL-MCNC: 10 MG/DL (ref 8–27)
BUN/CREAT SERPL: 13 (ref 12–28)
CALCIUM SERPL-MCNC: 9 MG/DL (ref 8.7–10.3)
CHLORIDE SERPL-SCNC: 105 MMOL/L (ref 96–106)
CO2 SERPL-SCNC: 22 MMOL/L (ref 20–29)
CREAT SERPL-MCNC: 0.76 MG/DL (ref 0.57–1)
EGFR: 86 ML/MIN/1.73
GLOBULIN SER-MCNC: 2.3 G/DL (ref 1.5–4.5)
GLUCOSE SERPL-MCNC: 85 MG/DL (ref 65–99)
PHOSPHATE SERPL-MCNC: 4 MG/DL (ref 3–4.3)
POTASSIUM SERPL-SCNC: 4.1 MMOL/L (ref 3.5–5.2)
PROT SERPL-MCNC: 6.4 G/DL (ref 6–8.5)
PTH-INTACT SERPL-MCNC: 11 PG/ML (ref 15–65)
SODIUM SERPL-SCNC: 142 MMOL/L (ref 134–144)
T4 FREE SERPL-MCNC: 1.35 NG/DL (ref 0.82–1.77)
TSH SERPL DL<=0.005 MIU/L-ACNC: 1.88 UIU/ML (ref 0.45–4.5)

## 2022-07-19 RX ORDER — LEVOTHYROXINE SODIUM 0.1 MG/1
TABLET ORAL
Qty: 30 TABLET | Refills: 11 | Status: SHIPPED | OUTPATIENT
Start: 2022-07-19

## 2022-07-19 RX ORDER — DEXTROAMPHETAMINE SULFATE 15 MG/1
15 CAPSULE, EXTENDED RELEASE ORAL DAILY
Qty: 30 CAPSULE | Refills: 0 | Status: SHIPPED | OUTPATIENT
Start: 2022-07-19 | End: 2022-08-17 | Stop reason: SDUPTHER

## 2022-07-19 RX ORDER — CALCITRIOL 0.25 UG/1
CAPSULE, LIQUID FILLED ORAL
Qty: 54 CAPSULE | Refills: 3 | Status: SHIPPED | OUTPATIENT
Start: 2022-07-19

## 2022-07-20 ENCOUNTER — OFFICE VISIT (OUTPATIENT)
Dept: ENDOCRINOLOGY | Facility: HOSPITAL | Age: 67
End: 2022-07-20
Payer: COMMERCIAL

## 2022-07-20 VITALS
SYSTOLIC BLOOD PRESSURE: 122 MMHG | DIASTOLIC BLOOD PRESSURE: 72 MMHG | WEIGHT: 169.4 LBS | OXYGEN SATURATION: 97 % | HEART RATE: 84 BPM | BODY MASS INDEX: 31.17 KG/M2 | HEIGHT: 62 IN

## 2022-07-20 DIAGNOSIS — E89.0 POSTOPERATIVE HYPOTHYROIDISM: Primary | ICD-10-CM

## 2022-07-20 DIAGNOSIS — E20.9 HYPOPARATHYROIDISM, UNSPECIFIED HYPOPARATHYROIDISM TYPE (HCC): ICD-10-CM

## 2022-07-20 PROCEDURE — 99214 OFFICE O/P EST MOD 30 MIN: CPT | Performed by: NURSE PRACTITIONER

## 2022-07-20 RX ORDER — ASPIRIN 81 MG/1
81 TABLET ORAL DAILY
COMMUNITY

## 2022-07-20 NOTE — PATIENT INSTRUCTIONS
Continue with levothyroxine 100 mcg - 6 days a week and take 1/2 tablet on Sunday  Continue with Calcitrol 0 25 mcg - 4 days a week  Continue with calcium and vitamin-D supplement  Follow-up in 6 months with lab work completed prior to visit

## 2022-07-20 NOTE — PROGRESS NOTES
Dorota Giang 78 y o  female MRN: [id number]    Encounter: [id number]      Assessment/Plan     Assessment: This is a 78y o -year-old female with postoperative hypothyroidism and hypoparathyroidism       Plan:  1   Postoperative hypothyroidism: TSH is now normal   Generally, she feels well     I have asked her to resume taking her levothyroxine 100 mcg daily Monday through Saturday with 1/2 tablet on Sunday  Titration of her levothyroxine dose may take place upon review of updated lab work      2   Hypoparathyroidism:  She is currently taking on calcitriol 0 25 mcg daily and calcium 600 mg once a daily    Her most recent calcium level is normal   For now, she will continue Calcitriol 0 25 mcg every other day with calcium   She will contact the office if she develops any worsening muscle cramping, numbness, tingling, perioral paresthesias or difficulty breathing        CC: Hypothyroidism/hypoparathyroidism follow-up    History of Present Illness     HPI:  67 y  o  year old female with history of hypothyroidism who presents for follow-up  She had a history of goiter and thyroid nodules reportedly that eventually underwent surgery for neck compressive symptoms at [facility] in February 2017   She then was treated for postoperative hypothyroidism with levothyroxine 100 mcg daily with a half tablet on Sunday   Her most recent TSH from July 19, 2022 is 1 880  with a free T4 of 1 35    Overall, she feels well but notes chronic joint aches, pains, muscle cramping at times    She denies any anterior neck discomfort, dysphagia or dysphonia        She is also taking calcitriol 0 25 mcg on Sunday, Tuesday, Thursday and Saturday, as well as, calcium 600 mg every evening with vitamin-D daily along with 5000 units of vitamin-D daily for postoperative hypoparathyroidism  Damaris Case most recent calcium from July 18, 2022 is 9 0 with an albumin of 4 1 and PTH is 11 0    Recent phosphorus level was 4 0   Her vitamin-D level from July 19, 2022 is 49 5  She denies any aresthesias, perioral paresthesias or shortness of breath  Review of Systems   Constitutional: Positive for fatigue  Negative for chills and fever  HENT: Negative  Negative for trouble swallowing and voice change  Eyes: Negative  Respiratory: Negative  Negative for chest tightness and shortness of breath  Cardiovascular: Negative  Negative for chest pain  Gastrointestinal: Negative  Negative for abdominal pain, constipation, diarrhea and vomiting  Endocrine: Positive for cold intolerance (at times)  Negative for polydipsia, polyphagia and polyuria  Genitourinary: Negative  Musculoskeletal: Positive for arthralgias  Skin: Negative  Allergic/Immunologic: Negative  Neurological: Negative  Negative for dizziness, syncope, light-headedness and headaches  Hematological: Negative  Psychiatric/Behavioral: Negative  All other systems reviewed and are negative  Historical Information   History reviewed  No pertinent past medical history  History reviewed  No pertinent surgical history    Social History   Social History     Substance and Sexual Activity   Alcohol Use None     Social History     Substance and Sexual Activity   Drug Use Not on file     Social History     Tobacco Use   Smoking Status Current Every Day Smoker    Packs/day: 0 33    Types: Cigarettes    Start date: 1988   Smokeless Tobacco Never Used     Family History:   Family History   Problem Relation Age of Onset    Arthritis Mother     Melanoma Mother     Cancer Mother     Hypertension Mother     Colon cancer Father     Heart attack Brother     Hypertension Brother        Meds/Allergies   Current Outpatient Medications   Medication Sig Dispense Refill    Ascorbic Acid (VITAMIN C) 500 MG CHEW Chew 500 mg daily      aspirin (ECOTRIN LOW STRENGTH) 81 mg EC tablet Take 81 mg by mouth daily      calcitriol (ROCALTROL) 0 25 mcg capsule TAKE 1 CAPSULE BY MOUTH ON SUNDAY, TUESDAY, THURSDAY AND SATURDAY 54 capsule 3    Calcium Carbonate-Vitamin D (CALCIUM 600+D PO) Take by mouth      Cholecalciferol (VITAMIN D3) 5000 units CAPS Take by mouth      cyanocobalamin (VITAMIN B-12) 1,000 mcg tablet Take 1,000 mcg by mouth Monday, Wednesday, and Friday   dextroamphetamine (DEXEDRINE SPANSULE) 15 MG 24 hr capsule Take 1 capsule (15 mg total) by mouth daily Max Daily Amount: 15 mg 30 capsule 0    folic acid (FOLVITE) 1 mg tablet Take 1 mg by mouth daily      levothyroxine 100 mcg tablet TAKE 1 TABLET BY MOUTH 6  DAYS OF THE WEEK AND HALF TAB ON SUNDAY 30 tablet 11    Biotin 5000 MCG CAPS Take 1 capsule by mouth daily (Patient not taking: Reported on 7/20/2022)      fluticasone (FLONASE) 50 mcg/act nasal spray 1 spray ech nostril (Patient not taking: Reported on 7/20/2022) 1 Bottle 5    Melatonin 10 MG TBDP Take by mouth (Patient not taking: No sig reported)      midodrine (PROAMATINE) 10 MG tablet Take 1 tablet (10 mg total) by mouth 3 (three) times a day (Patient not taking: Reported on 7/20/2022) 90 tablet 5     No current facility-administered medications for this visit  Allergies   Allergen Reactions    Other Shortness Of Breath and Swelling     Allergy to bee stings    Penicillins Other (See Comments)     Reaction unknown       Objective   Vitals: Blood pressure 122/72, pulse 84, height 5' 2" (1 575 m), weight 76 8 kg (169 lb 6 4 oz), SpO2 97 %  Physical Exam  Vitals reviewed  Constitutional:       Appearance: She is well-developed  She is obese  HENT:      Head: Normocephalic and atraumatic  Eyes:      Conjunctiva/sclera: Conjunctivae normal       Pupils: Pupils are equal, round, and reactive to light  Comments: Wears glasses    Cardiovascular:      Rate and Rhythm: Normal rate and regular rhythm  Heart sounds: Normal heart sounds  Pulmonary:      Effort: Pulmonary effort is normal       Breath sounds: Normal breath sounds     Abdominal:      General: Bowel sounds are normal       Palpations: Abdomen is soft  Musculoskeletal:         General: Normal range of motion  Cervical back: Normal range of motion and neck supple  Skin:     General: Skin is warm and dry  Neurological:      Mental Status: She is alert and oriented to person, place, and time  Psychiatric:         Behavior: Behavior normal          Thought Content: Thought content normal          Judgment: Judgment normal        Lab Results:   Lab Results   Component Value Date/Time    Free t4 1 35 07/18/2022 10:53 AM    Free t4 1 40 01/17/2022 08:53 AM     Portions of the record may have been created with voice recognition software  Occasional wrong word or "sound a like" substitutions may have occurred due to the inherent limitations of voice recognition software  Read the chart carefully and recognize, using context, where substitutions have occurred

## 2022-08-17 DIAGNOSIS — G90.A POTS (POSTURAL ORTHOSTATIC TACHYCARDIA SYNDROME): ICD-10-CM

## 2022-08-18 RX ORDER — DEXTROAMPHETAMINE SULFATE 15 MG/1
15 CAPSULE, EXTENDED RELEASE ORAL DAILY
Qty: 90 CAPSULE | Refills: 0 | Status: SHIPPED | OUTPATIENT
Start: 2022-08-18 | End: 2022-08-22 | Stop reason: SDUPTHER

## 2022-08-22 DIAGNOSIS — G90.A POTS (POSTURAL ORTHOSTATIC TACHYCARDIA SYNDROME): ICD-10-CM

## 2022-08-22 RX ORDER — DEXTROAMPHETAMINE SULFATE 15 MG/1
15 CAPSULE, EXTENDED RELEASE ORAL DAILY
Qty: 90 CAPSULE | Refills: 0 | Status: SHIPPED | OUTPATIENT
Start: 2022-08-22 | End: 2022-08-26 | Stop reason: SDUPTHER

## 2022-08-26 DIAGNOSIS — G90.A POTS (POSTURAL ORTHOSTATIC TACHYCARDIA SYNDROME): ICD-10-CM

## 2022-08-26 RX ORDER — DEXTROAMPHETAMINE SULFATE 15 MG/1
15 CAPSULE, EXTENDED RELEASE ORAL DAILY
Qty: 30 CAPSULE | Refills: 0 | Status: SHIPPED | OUTPATIENT
Start: 2022-08-26 | End: 2022-09-26 | Stop reason: SDUPTHER

## 2022-09-26 DIAGNOSIS — G90.A POTS (POSTURAL ORTHOSTATIC TACHYCARDIA SYNDROME): ICD-10-CM

## 2022-09-26 RX ORDER — DEXTROAMPHETAMINE SULFATE 15 MG/1
15 CAPSULE, EXTENDED RELEASE ORAL DAILY
Qty: 90 CAPSULE | Refills: 0 | Status: SHIPPED | OUTPATIENT
Start: 2022-09-26

## 2022-09-26 NOTE — TELEPHONE ENCOUNTER
The patient called requesting a refill for the medication and for it to be switched back to a 90 day supply

## 2022-12-22 DIAGNOSIS — G90.A POTS (POSTURAL ORTHOSTATIC TACHYCARDIA SYNDROME): ICD-10-CM

## 2022-12-22 RX ORDER — DEXTROAMPHETAMINE SULFATE 15 MG/1
15 CAPSULE, EXTENDED RELEASE ORAL DAILY
Qty: 90 CAPSULE | Refills: 0 | Status: SHIPPED | OUTPATIENT
Start: 2022-12-22

## 2022-12-22 NOTE — TELEPHONE ENCOUNTER
Requested medication(s) are due for refill today: No  Patient has already received a courtesy refill: Yes  Other reason request has been forwarded to provider: Protocol failed

## 2023-01-24 LAB
25(OH)D3+25(OH)D2 SERPL-MCNC: 58.7 NG/ML (ref 30–100)
ALBUMIN SERPL-MCNC: 4.3 G/DL (ref 3.8–4.8)
ALBUMIN/GLOB SERPL: 1.7 {RATIO} (ref 1.2–2.2)
ALP SERPL-CCNC: 82 IU/L (ref 44–121)
ALT SERPL-CCNC: 7 IU/L (ref 0–32)
AST SERPL-CCNC: 10 IU/L (ref 0–40)
BILIRUB SERPL-MCNC: 0.4 MG/DL (ref 0–1.2)
BUN SERPL-MCNC: 15 MG/DL (ref 8–27)
BUN/CREAT SERPL: 16 (ref 12–28)
CALCIUM SERPL-MCNC: 9.9 MG/DL (ref 8.7–10.3)
CHLORIDE SERPL-SCNC: 104 MMOL/L (ref 96–106)
CO2 SERPL-SCNC: 23 MMOL/L (ref 20–29)
CREAT SERPL-MCNC: 0.92 MG/DL (ref 0.57–1)
EGFR: 69 ML/MIN/1.73
GLOBULIN SER-MCNC: 2.6 G/DL (ref 1.5–4.5)
GLUCOSE SERPL-MCNC: 88 MG/DL (ref 70–99)
PHOSPHATE SERPL-MCNC: 4.3 MG/DL (ref 3–4.3)
POTASSIUM SERPL-SCNC: 4.2 MMOL/L (ref 3.5–5.2)
PROT SERPL-MCNC: 6.9 G/DL (ref 6–8.5)
PTH-INTACT SERPL-MCNC: 7 PG/ML (ref 15–65)
SODIUM SERPL-SCNC: 143 MMOL/L (ref 134–144)
T4 FREE SERPL-MCNC: 1.35 NG/DL (ref 0.82–1.77)
TSH SERPL DL<=0.005 MIU/L-ACNC: 3.7 UIU/ML (ref 0.45–4.5)

## 2023-01-25 ENCOUNTER — OFFICE VISIT (OUTPATIENT)
Dept: ENDOCRINOLOGY | Facility: HOSPITAL | Age: 68
End: 2023-01-25

## 2023-01-25 VITALS
WEIGHT: 173 LBS | HEIGHT: 62 IN | HEART RATE: 88 BPM | BODY MASS INDEX: 31.83 KG/M2 | DIASTOLIC BLOOD PRESSURE: 78 MMHG | SYSTOLIC BLOOD PRESSURE: 118 MMHG

## 2023-01-25 DIAGNOSIS — E89.0 POSTOPERATIVE HYPOTHYROIDISM: Primary | ICD-10-CM

## 2023-01-25 DIAGNOSIS — G90.A POTS (POSTURAL ORTHOSTATIC TACHYCARDIA SYNDROME): ICD-10-CM

## 2023-01-25 DIAGNOSIS — E20.9 HYPOPARATHYROIDISM, UNSPECIFIED HYPOPARATHYROIDISM TYPE (HCC): ICD-10-CM

## 2023-01-25 NOTE — PROGRESS NOTES
Sunny Mitchell 78 y o  female MRN: [id number]    Encounter: [id number]      Assessment/Plan     Assessment: This is a 78y o -year-old female with hypothyroidism and hypoparathyroidism  Plan:  1   Postoperative hypothyroidism: TSH is normal   Generally, she feels well   She will continue levothyroxine 100 mcg daily Monday through Saturday with 1/2 tablet on Sunday  Titration of her levothyroxine dose may take place upon review of updated lab work      2   Hypoparathyroidism:  She is currently taking on calcitriol 0 25 mcg 4 days a week and calcium 600 mg once a daily    Her most recent calcium level is normal   For now, she will continue Calcitriol 0 25 mcg every other day with calcium   She will contact the office if she develops any worsening muscle cramping, numbness, tingling, perioral paresthesias or difficulty breathing  CC: Hypothyroidism/hypoparathyroidism follow-up    History of Present Illness     HPI:  69 y  o  year old female with history of hypothyroidism who presents for follow-up  She had a history of goiter and thyroid nodules reportedly that eventually underwent surgery for neck compressive symptoms at [facility] in February 2017   She then was treated for postoperative hypothyroidism with levothyroxine 100 mcg daily with a half tablet on Sunday   Her most recent TSH from January 23, 2023 is 3 70 with a free T4 of 1 35    Overall, she feels well but notes chronic joint aches, pains, muscle cramping at times    She denies any anterior neck discomfort, dysphagia or dysphonia        She is also taking calcitriol 0 25 mcg on Sunday, Tuesday, Thursday and Saturday, as well as, calcium 600 mg every evening with vitamin-D daily along with 5000 units of vitamin-D daily for postoperative hypoparathyroidism  Dayana Reilly most recent calcium from January 23, 2023 is 9 9 with an albumin of 4 3 and PTH is 7 0   Recent phosphorus level was 4 3   Her vitamin-D level from January 23, 2023 is 58 7   She denies any aresthesias, perioral paresthesias or shortness of breath      Review of Systems   Constitutional: Negative  Negative for chills, fatigue and fever  HENT: Negative  Negative for trouble swallowing and voice change  Eyes: Negative  Negative for photophobia, pain, discharge, redness, itching and visual disturbance  Respiratory: Negative  Negative for chest tightness and shortness of breath  Cardiovascular: Negative  Negative for chest pain  Gastrointestinal: Negative  Negative for abdominal pain, constipation, diarrhea and vomiting  Endocrine: Negative for cold intolerance, heat intolerance, polydipsia, polyphagia and polyuria  Genitourinary: Negative  Musculoskeletal: Positive for arthralgias (arthritis to hands bilaterally)  Skin: Negative  Allergic/Immunologic: Negative  Neurological: Negative  Negative for dizziness, syncope, light-headedness and headaches  Hematological: Negative  Psychiatric/Behavioral: Negative  All other systems reviewed and are negative  Historical Information   History reviewed  No pertinent past medical history  History reviewed  No pertinent surgical history    Social History   Social History     Substance and Sexual Activity   Alcohol Use None     Social History     Substance and Sexual Activity   Drug Use Not on file     Social History     Tobacco Use   Smoking Status Every Day   • Packs/day: 0 33   • Types: Cigarettes   • Start date: 1988   Smokeless Tobacco Never     Family History:   Family History   Problem Relation Age of Onset   • Arthritis Mother    • Melanoma Mother    • Cancer Mother    • Hypertension Mother    • Colon cancer Father    • Heart attack Brother    • Hypertension Brother        Meds/Allergies   Current Outpatient Medications   Medication Sig Dispense Refill   • Ascorbic Acid (VITAMIN C) 500 MG CHEW Chew 500 mg daily     • aspirin (ECOTRIN LOW STRENGTH) 81 mg EC tablet Take 81 mg by mouth daily     • calcitriol (ROCALTROL) 0 25 mcg capsule TAKE 1 CAPSULE BY MOUTH ON SUNDAY, TUESDAY, THURSDAY AND SATURDAY 54 capsule 3   • Calcium Carbonate-Vitamin D (CALCIUM 600+D PO) Take by mouth     • Cholecalciferol (VITAMIN D3) 5000 units CAPS Take by mouth     • cyanocobalamin (VITAMIN B-12) 1,000 mcg tablet Take 1,000 mcg by mouth Monday, Wednesday, and Friday  • dextroamphetamine (DEXEDRINE SPANSULE) 15 MG 24 hr capsule Take 1 capsule (15 mg total) by mouth daily Max Daily Amount: 15 mg 90 capsule 0   • folic acid (FOLVITE) 1 mg tablet Take 1 mg by mouth daily     • levothyroxine 100 mcg tablet TAKE 1 TABLET BY MOUTH 6  DAYS OF THE WEEK AND HALF TAB ON SUNDAY 30 tablet 11   • Biotin 5000 MCG CAPS Take 1 capsule by mouth daily (Patient not taking: Reported on 7/20/2022)     • fluticasone (FLONASE) 50 mcg/act nasal spray 1 spray ech nostril (Patient not taking: Reported on 7/20/2022) 1 Bottle 5   • Melatonin 10 MG TBDP Take by mouth (Patient not taking: Reported on 7/29/2021)     • midodrine (PROAMATINE) 10 MG tablet Take 1 tablet (10 mg total) by mouth 3 (three) times a day (Patient not taking: Reported on 7/20/2022) 90 tablet 5     No current facility-administered medications for this visit  Allergies   Allergen Reactions   • Bee Venom Shortness Of Breath   • Penicillins Other (See Comments)     Reaction unknown       Objective   Vitals: Blood pressure 118/78, pulse 88, height 5' 2" (1 575 m), weight 78 5 kg (173 lb)  Physical Exam  Vitals reviewed  Constitutional:       Appearance: She is well-developed  HENT:      Head: Normocephalic and atraumatic  Eyes:      Conjunctiva/sclera: Conjunctivae normal       Pupils: Pupils are equal, round, and reactive to light  Cardiovascular:      Rate and Rhythm: Normal rate and regular rhythm  Heart sounds: Normal heart sounds  Pulmonary:      Effort: Pulmonary effort is normal       Breath sounds: Normal breath sounds     Abdominal:      General: Bowel sounds are normal       Palpations: Abdomen is soft  Musculoskeletal:         General: Normal range of motion  Cervical back: Normal range of motion and neck supple  Skin:     General: Skin is warm and dry  Neurological:      Mental Status: She is alert and oriented to person, place, and time  Psychiatric:         Behavior: Behavior normal          Thought Content: Thought content normal          Judgment: Judgment normal        Lab Results:   Lab Results   Component Value Date/Time    Free t4 1 35 01/23/2023 09:30 AM    Free t4 1 35 07/18/2022 10:53 AM     Portions of the record may have been created with voice recognition software  Occasional wrong word or "sound a like" substitutions may have occurred due to the inherent limitations of voice recognition software  Read the chart carefully and recognize, using context, where substitutions have occurred

## 2023-03-26 DIAGNOSIS — G90.A POTS (POSTURAL ORTHOSTATIC TACHYCARDIA SYNDROME): ICD-10-CM

## 2023-03-26 NOTE — TELEPHONE ENCOUNTER
PT  Called in requesting that her   dextroamphetamine 15 MG needs to be renewed and called in to [facility] pharmacy

## 2023-03-28 RX ORDER — DEXTROAMPHETAMINE SULFATE 15 MG/1
15 CAPSULE, EXTENDED RELEASE ORAL DAILY
Qty: 90 CAPSULE | Refills: 0 | Status: SHIPPED | OUTPATIENT
Start: 2023-03-28 | End: 2023-03-30 | Stop reason: SDUPTHER

## 2023-03-30 DIAGNOSIS — G90.A POTS (POSTURAL ORTHOSTATIC TACHYCARDIA SYNDROME): ICD-10-CM

## 2023-03-30 NOTE — TELEPHONE ENCOUNTER
Requested medication(s) are due for refill today: Yes  Patient has already received a courtesy refill: No  Other reason request has been forwarded to provider: Guidelines failed  The pharmacy never got the script from the 28th

## 2023-03-30 NOTE — TELEPHONE ENCOUNTER
Patient states the pharmacy never got the script for the Dextroamphetamine 15 mg  I called the pharmacy to verify and they stated they never got the script

## 2023-03-31 DIAGNOSIS — G90.A POTS (POSTURAL ORTHOSTATIC TACHYCARDIA SYNDROME): ICD-10-CM

## 2023-03-31 RX ORDER — DEXTROAMPHETAMINE SULFATE 15 MG/1
15 CAPSULE, EXTENDED RELEASE ORAL DAILY
Qty: 90 CAPSULE | Refills: 0 | Status: SHIPPED | OUTPATIENT
Start: 2023-03-31 | End: 2023-03-31 | Stop reason: SDUPTHER

## 2023-03-31 RX ORDER — DEXTROAMPHETAMINE SULFATE 15 MG/1
15 CAPSULE, EXTENDED RELEASE ORAL DAILY
Qty: 90 CAPSULE | Refills: 0 | Status: SHIPPED | OUTPATIENT
Start: 2023-03-31

## 2023-03-31 NOTE — TELEPHONE ENCOUNTER
Please order, pharmacy called stating [facility] is not able to order this class of medication, I believe this was an issue before

## 2023-06-23 DIAGNOSIS — G90.A POTS (POSTURAL ORTHOSTATIC TACHYCARDIA SYNDROME): ICD-10-CM

## 2023-06-23 RX ORDER — DEXTROAMPHETAMINE SULFATE 15 MG/1
15 CAPSULE, EXTENDED RELEASE ORAL DAILY
Qty: 90 CAPSULE | Refills: 0 | Status: SHIPPED | OUTPATIENT
Start: 2023-06-23

## 2023-07-11 LAB
25(OH)D3+25(OH)D2 SERPL-MCNC: 46.8 NG/ML (ref 30–100)
ALBUMIN SERPL-MCNC: 4.3 G/DL (ref 3.9–4.9)
ALBUMIN/GLOB SERPL: 2 {RATIO} (ref 1.2–2.2)
ALP SERPL-CCNC: 77 IU/L (ref 44–121)
ALT SERPL-CCNC: 8 IU/L (ref 0–32)
AST SERPL-CCNC: 14 IU/L (ref 0–40)
BILIRUB SERPL-MCNC: 0.6 MG/DL (ref 0–1.2)
BUN SERPL-MCNC: 16 MG/DL (ref 8–27)
BUN/CREAT SERPL: 17 (ref 12–28)
CALCIUM SERPL-MCNC: 9.3 MG/DL (ref 8.7–10.3)
CHLORIDE SERPL-SCNC: 105 MMOL/L (ref 96–106)
CO2 SERPL-SCNC: 21 MMOL/L (ref 20–29)
CREAT SERPL-MCNC: 0.94 MG/DL (ref 0.57–1)
EGFR: 67 ML/MIN/1.73
GLOBULIN SER-MCNC: 2.2 G/DL (ref 1.5–4.5)
GLUCOSE SERPL-MCNC: 85 MG/DL (ref 70–99)
PHOSPHATE SERPL-MCNC: 4.2 MG/DL (ref 3–4.3)
POTASSIUM SERPL-SCNC: 4.5 MMOL/L (ref 3.5–5.2)
PROT SERPL-MCNC: 6.5 G/DL (ref 6–8.5)
PTH-INTACT SERPL-MCNC: 11 PG/ML (ref 15–65)
SODIUM SERPL-SCNC: 142 MMOL/L (ref 134–144)
T4 FREE SERPL-MCNC: 1.51 NG/DL (ref 0.82–1.77)
TSH SERPL DL<=0.005 MIU/L-ACNC: 1.23 UIU/ML (ref 0.45–4.5)

## 2023-08-09 ENCOUNTER — OFFICE VISIT (OUTPATIENT)
Dept: ENDOCRINOLOGY | Facility: HOSPITAL | Age: 68
End: 2023-08-09
Payer: COMMERCIAL

## 2023-08-09 VITALS
WEIGHT: 179 LBS | OXYGEN SATURATION: 98 % | HEIGHT: 62 IN | BODY MASS INDEX: 32.94 KG/M2 | SYSTOLIC BLOOD PRESSURE: 118 MMHG | DIASTOLIC BLOOD PRESSURE: 78 MMHG | HEART RATE: 89 BPM

## 2023-08-09 DIAGNOSIS — E20.9 HYPOPARATHYROIDISM, UNSPECIFIED HYPOPARATHYROIDISM TYPE (HCC): ICD-10-CM

## 2023-08-09 DIAGNOSIS — G90.A POTS (POSTURAL ORTHOSTATIC TACHYCARDIA SYNDROME): ICD-10-CM

## 2023-08-09 DIAGNOSIS — E89.0 POSTOPERATIVE HYPOTHYROIDISM: Primary | ICD-10-CM

## 2023-08-09 PROCEDURE — 99214 OFFICE O/P EST MOD 30 MIN: CPT | Performed by: NURSE PRACTITIONER

## 2023-08-09 RX ORDER — LEVOTHYROXINE SODIUM 0.1 MG/1
TABLET ORAL
Qty: 90 TABLET | Refills: 3 | Status: SHIPPED | OUTPATIENT
Start: 2023-08-09

## 2023-08-09 NOTE — PATIENT INSTRUCTIONS
Continue with levothyroxine 100 mcg - 6 days a week and take 1/2 tablet on Sunday. Continue with Calcitrol 0.25 mcg - 4 days a week. Continue with calcium and vitamin-D supplement. Follow-up in 6 months with lab work completed prior to visit.

## 2023-08-09 NOTE — PROGRESS NOTES
Monica Sarabia 80 y.o. female MRN: [id number]    Encounter: [id number]      Assessment/Plan     Assessment: This is a 80y.o.-year-old female with hypothyroidism and hypoparathyroidism. Plan:  1.  Postoperative hypothyroidism: TSH is normal.  Generally, she feels well.  She will continue levothyroxine 100 mcg daily Monday through Saturday with 1/2 tablet on Sunday. Titration of her levothyroxine dose may take place upon review of updated lab work.     2.  Hypoparathyroidism:  She is currently taking on calcitriol 0.25 mcg 4 days a week and calcium 600 mg once a daily.   Her most recent calcium level is normal.  For now, she will continue Calcitriol 0.25 mcg every other day with calcium.  She will contact the office if she develops any worsening muscle cramping, numbness, tingling, perioral paresthesias or difficulty breathing. CC: Hypothyroidism/hypoparathyroidism follow-up    History of Present Illness     HPI:  71 y. o. year old female with history of hypothyroidism who presents for follow-up. She had a history of goiter and thyroid nodules reportedly that eventually underwent surgery for neck compressive symptoms at [facility] in February 2017.  She then was treated for postoperative hypothyroidism with levothyroxine 100 mcg daily with a half tablet on Sunday.  Her most recent TSH from July 10, 2023 is 1. 23 with a free T4 of 1.51.   Overall, she feels well but notes chronic joint aches, pains, muscle cramping at times.   She denies any anterior neck discomfort, dysphagia or dysphonia.       She is also taking calcitriol 0.25 mcg on Sunday, Tuesday, Thursday and Saturday, as well as, calcium 600 mg every evening with vitamin-D daily along with 5000 units of vitamin-D daily for postoperative hypoparathyroidism. Anni Balloon most recent calcium from July 10, 2023 is 9.3 with an albumin of 4.3 and PTH is 11.0.  Recent phosphorus level was 4.3.  Her vitamin-D level from July 10, 2023 is 46.8.  She denies any aresthesias, perioral paresthesias or shortness of breath.       Review of Systems   Constitutional: Negative. Negative for chills, fatigue and fever. HENT: Negative. Negative for trouble swallowing and voice change. Eyes: Negative. Respiratory: Negative. Negative for chest tightness and shortness of breath. Cardiovascular: Negative. Negative for chest pain. Gastrointestinal: Negative. Negative for abdominal pain, constipation, diarrhea and vomiting. Endocrine: Negative for cold intolerance, heat intolerance, polydipsia, polyphagia and polyuria. Genitourinary: Negative. Musculoskeletal: Positive for arthralgias (arthritis to hands). Skin: Negative. Allergic/Immunologic: Negative. Neurological: Negative. Negative for dizziness, syncope, light-headedness and headaches. Hematological: Negative. Psychiatric/Behavioral: Negative. All other systems reviewed and are negative. Historical Information   No past medical history on file. No past surgical history on file.   Social History   Social History     Substance and Sexual Activity   Alcohol Use None     Social History     Substance and Sexual Activity   Drug Use Not on file     Social History     Tobacco Use   Smoking Status Every Day   • Packs/day: 0.33   • Types: Cigarettes   • Start date: 1988   Smokeless Tobacco Never     Family History:   Family History   Problem Relation Age of Onset   • Arthritis Mother    • Melanoma Mother    • Cancer Mother    • Hypertension Mother    • Colon cancer Father    • Heart attack Brother    • Hypertension Brother        Meds/Allergies   Current Outpatient Medications   Medication Sig Dispense Refill   • Ascorbic Acid (VITAMIN C) 500 MG CHEW Chew 500 mg daily     • aspirin (ECOTRIN LOW STRENGTH) 81 mg EC tablet Take 81 mg by mouth daily     • Biotin 5000 MCG CAPS Take 1 capsule by mouth daily (Patient not taking: Reported on 7/20/2022)     • calcitriol (ROCALTROL) 0.25 mcg capsule TAKE 1 CAPSULE BY MOUTH ON SUNDAY, TUESDAY, THURSDAY AND SATURDAY 54 capsule 3   • Calcium Carbonate-Vitamin D (CALCIUM 600+D PO) Take by mouth     • Cholecalciferol (VITAMIN D3) 5000 units CAPS Take by mouth     • cyanocobalamin (VITAMIN B-12) 1,000 mcg tablet Take 1,000 mcg by mouth Monday, Wednesday, and Friday. • dextroamphetamine (DEXEDRINE SPANSULE) 15 MG 24 hr capsule Take 1 capsule (15 mg total) by mouth daily Max Daily Amount: 15 mg 90 capsule 0   • fluticasone (FLONASE) 50 mcg/act nasal spray 1 spray ech nostril (Patient not taking: Reported on 7/20/2022) 1 Bottle 5   • folic acid (FOLVITE) 1 mg tablet Take 1 mg by mouth daily     • levothyroxine 100 mcg tablet TAKE 1 TABLET BY MOUTH 6  DAYS OF THE WEEK AND HALF TAB ON SUNDAY 30 tablet 11   • Melatonin 10 MG TBDP Take by mouth (Patient not taking: Reported on 7/29/2021)     • midodrine (PROAMATINE) 10 MG tablet Take 1 tablet (10 mg total) by mouth 3 (three) times a day (Patient not taking: Reported on 7/20/2022) 90 tablet 5     No current facility-administered medications for this visit. Allergies   Allergen Reactions   • Bee Venom Shortness Of Breath   • Penicillins Other (See Comments)     Reaction unknown       Objective   Vitals: There were no vitals taken for this visit. Physical Exam  Vitals reviewed. Constitutional:       Appearance: She is well-developed. HENT:      Head: Normocephalic and atraumatic. Eyes:      Conjunctiva/sclera: Conjunctivae normal.      Pupils: Pupils are equal, round, and reactive to light. Cardiovascular:      Rate and Rhythm: Normal rate and regular rhythm. Heart sounds: Normal heart sounds. Pulmonary:      Effort: Pulmonary effort is normal.      Breath sounds: Normal breath sounds. Abdominal:      General: Bowel sounds are normal.      Palpations: Abdomen is soft. Musculoskeletal:         General: Normal range of motion. Cervical back: Normal range of motion and neck supple.    Skin:     General: Skin is warm and dry. Neurological:      Mental Status: She is alert and oriented to person, place, and time. Psychiatric:         Behavior: Behavior normal.         Thought Content: Thought content normal.         Judgment: Judgment normal.         Lab Results:   Lab Results   Component Value Date/Time    Free t4 1.51 07/10/2023 10:20 AM    Free t4 1.35 01/23/2023 09:30 AM     Portions of the record may have been created with voice recognition software. Occasional wrong word or "sound a like" substitutions may have occurred due to the inherent limitations of voice recognition software. Read the chart carefully and recognize, using context, where substitutions have occurred.

## 2023-09-20 DIAGNOSIS — E20.9 HYPOPARATHYROIDISM, UNSPECIFIED HYPOPARATHYROIDISM TYPE (HCC): ICD-10-CM

## 2023-09-21 RX ORDER — CALCITRIOL 0.25 UG/1
CAPSULE, LIQUID FILLED ORAL
Qty: 54 CAPSULE | Refills: 3 | Status: SHIPPED | OUTPATIENT
Start: 2023-09-21

## 2023-09-24 DIAGNOSIS — G90.A POTS (POSTURAL ORTHOSTATIC TACHYCARDIA SYNDROME): ICD-10-CM

## 2023-09-25 NOTE — TELEPHONE ENCOUNTER
Medication Refill Request     Name Dextroamphetamine   Dose/Frequency 15 mg 24 hr capsule  Quantity 90 caps  Verified pharmacy   [x]  Verified ordering Provider   [x]  Does patient have enough for the next 3 days?  Yes [x] No []

## 2023-09-26 RX ORDER — DEXTROAMPHETAMINE SULFATE 15 MG/1
15 CAPSULE, EXTENDED RELEASE ORAL DAILY
Qty: 90 CAPSULE | Refills: 0 | Status: SHIPPED | OUTPATIENT
Start: 2023-09-26

## 2023-12-18 DIAGNOSIS — G90.A POTS (POSTURAL ORTHOSTATIC TACHYCARDIA SYNDROME): ICD-10-CM

## 2023-12-18 RX ORDER — DEXTROAMPHETAMINE SULFATE 15 MG/1
15 CAPSULE, EXTENDED RELEASE ORAL DAILY
Qty: 90 CAPSULE | Refills: 1 | OUTPATIENT
Start: 2023-12-18

## 2023-12-18 NOTE — TELEPHONE ENCOUNTER
Is this something mariann prescribes? I do not see anything about this in his notes. I would refer back to PCP for this unless mariann is the one managing this. Would wait for kyle to see what he wants to do

## 2023-12-21 DIAGNOSIS — G90.A POTS (POSTURAL ORTHOSTATIC TACHYCARDIA SYNDROME): ICD-10-CM

## 2023-12-21 RX ORDER — DEXTROAMPHETAMINE SULFATE 15 MG/1
15 CAPSULE, EXTENDED RELEASE ORAL DAILY
Qty: 90 CAPSULE | Refills: 0 | Status: SHIPPED | OUTPATIENT
Start: 2023-12-21

## 2023-12-21 NOTE — TELEPHONE ENCOUNTER
Patient requested refill, Yusuf has filled in the past, but I know in the past a MD or DO had to order. Please advise.

## 2024-02-09 LAB
25(OH)D3+25(OH)D2 SERPL-MCNC: 63.5 NG/ML (ref 30–100)
ALBUMIN SERPL-MCNC: 4.3 G/DL (ref 3.9–4.9)
ALBUMIN/GLOB SERPL: 1.6 {RATIO} (ref 1.2–2.2)
ALP SERPL-CCNC: 81 IU/L (ref 44–121)
ALT SERPL-CCNC: 7 IU/L (ref 0–32)
AST SERPL-CCNC: 13 IU/L (ref 0–40)
BILIRUB SERPL-MCNC: 0.4 MG/DL (ref 0–1.2)
BUN SERPL-MCNC: 12 MG/DL (ref 8–27)
BUN/CREAT SERPL: 14 (ref 12–28)
CALCIUM SERPL-MCNC: 9.5 MG/DL (ref 8.7–10.3)
CHLORIDE SERPL-SCNC: 104 MMOL/L (ref 96–106)
CO2 SERPL-SCNC: 24 MMOL/L (ref 20–29)
CREAT SERPL-MCNC: 0.83 MG/DL (ref 0.57–1)
EGFR: 77 ML/MIN/1.73
GLOBULIN SER-MCNC: 2.7 G/DL (ref 1.5–4.5)
GLUCOSE SERPL-MCNC: 91 MG/DL (ref 70–99)
PHOSPHATE SERPL-MCNC: 4.1 MG/DL (ref 3–4.3)
POTASSIUM SERPL-SCNC: 4.4 MMOL/L (ref 3.5–5.2)
PROT SERPL-MCNC: 7 G/DL (ref 6–8.5)
PTH-INTACT SERPL-MCNC: 10 PG/ML (ref 15–65)
SODIUM SERPL-SCNC: 143 MMOL/L (ref 134–144)
T4 FREE SERPL-MCNC: 1.65 NG/DL (ref 0.82–1.77)
TSH SERPL DL<=0.005 MIU/L-ACNC: 2.88 UIU/ML (ref 0.45–4.5)

## 2024-02-14 ENCOUNTER — OFFICE VISIT (OUTPATIENT)
Dept: ENDOCRINOLOGY | Facility: HOSPITAL | Age: 69
End: 2024-02-14
Payer: COMMERCIAL

## 2024-02-14 VITALS
HEIGHT: 62 IN | DIASTOLIC BLOOD PRESSURE: 78 MMHG | OXYGEN SATURATION: 97 % | SYSTOLIC BLOOD PRESSURE: 126 MMHG | HEART RATE: 86 BPM | BODY MASS INDEX: 32.5 KG/M2 | WEIGHT: 176.6 LBS

## 2024-02-14 DIAGNOSIS — E89.0 POSTOPERATIVE HYPOTHYROIDISM: Primary | ICD-10-CM

## 2024-02-14 DIAGNOSIS — E20.9 HYPOPARATHYROIDISM, UNSPECIFIED HYPOPARATHYROIDISM TYPE (HCC): ICD-10-CM

## 2024-02-14 PROCEDURE — 99214 OFFICE O/P EST MOD 30 MIN: CPT | Performed by: NURSE PRACTITIONER

## 2024-02-14 NOTE — PROGRESS NOTES
Imani Luciano 69 y.o. female MRN: [id number]    Encounter: [id number]      Assessment/Plan     Assessment:  This is a 69 y.o.-year-old female with hypothyroidism and hypoparathyroidism.     Plan:  1.  Postoperative hypothyroidism: TSH is normal.  Generally, she feels well.  She will continue levothyroxine 100 mcg daily Monday through Saturday with 1/2 tablet on Sunday. Titration of her levothyroxine dose may take place upon review of updated lab work.     2.  Hypoparathyroidism:  She is currently taking on calcitriol 0.25 mcg - 4 days a week and calcium 600 mg once a daily.   Her most recent calcium level is normal.  For now, she will continue Calcitriol 0.25 mcg every other day with calcium.  She will contact the office if she develops any worsening muscle cramping, numbness, tingling, perioral paresthesias or difficulty breathing.    CC: Hypothyroidism/Hypoparathyroidism follow-up     History of Present Illness     HPI:  69 y.o. year old female with history of hypothyroidism who presents for follow-up. She had a history of goiter and thyroid nodules reportedly that eventually underwent surgery for neck compressive symptoms at [facility] in February 2017.  She then was treated for postoperative hypothyroidism with levothyroxine 100 mcg daily with a half tablet on Sunday.  Her most recent TSH from February 8, 2024 is 2.88 with a free T4 of 1.65.   Overall, she feels well but notes chronic joint aches, pains, muscle cramping at times.   She denies any anterior neck discomfort, dysphagia or dysphonia.       She is also taking calcitriol 0.25 mcg on Sunday, Tuesday, Thursday and Saturday, as well as, calcium 600 mg every evening with vitamin-D daily along with 5000 units of vitamin-D daily for postoperative hypoparathyroidism.   Her most recent calcium from February 8, 2024 is 9.5 with an albumin of 4.3 and PTH is 10.0.  Recent phosphorus level was 4.1.  Her vitamin-D level from February 8, 2024 is 63.5. She denies any  aresthesias, perioral paresthesias or shortness of breath.       Review of Systems   Constitutional: Negative.  Negative for chills, fatigue and fever.   HENT: Negative.  Negative for trouble swallowing and voice change.    Eyes: Negative.  Negative for photophobia, pain, discharge, redness, itching and visual disturbance.   Respiratory: Negative.  Negative for chest tightness and shortness of breath.    Cardiovascular: Negative.  Negative for chest pain.   Gastrointestinal: Negative.  Negative for abdominal pain, constipation, diarrhea and vomiting.   Endocrine: Negative for cold intolerance, heat intolerance, polydipsia, polyphagia and polyuria.   Genitourinary: Negative.    Musculoskeletal:  Positive for arthralgias (bilateral hands).   Skin: Negative.    Allergic/Immunologic: Negative.    Neurological: Negative.  Negative for dizziness, syncope, light-headedness and headaches.   Hematological: Negative.    Psychiatric/Behavioral: Negative.     All other systems reviewed and are negative.      Historical Information   History reviewed. No pertinent past medical history.  History reviewed. No pertinent surgical history.  Social History   Social History     Substance and Sexual Activity   Alcohol Use None     Social History     Substance and Sexual Activity   Drug Use Not on file     Social History     Tobacco Use   Smoking Status Every Day    Current packs/day: 0.33    Average packs/day: 0.3 packs/day for 36.1 years (11.9 ttl pk-yrs)    Types: Cigarettes    Start date: 1988   Smokeless Tobacco Never     Family History:   Family History   Problem Relation Age of Onset    Arthritis Mother     Melanoma Mother     Cancer Mother     Hypertension Mother     Colon cancer Father     Heart attack Brother     Hypertension Brother        Meds/Allergies   Current Outpatient Medications   Medication Sig Dispense Refill    Ascorbic Acid (VITAMIN C) 500 MG CHEW Chew 500 mg daily      aspirin (ECOTRIN LOW STRENGTH) 81 mg EC  "tablet Take 81 mg by mouth daily      calcitriol (ROCALTROL) 0.25 mcg capsule TAKE 1 CAPSULE BY MOUTH ON SUNDAY, TUESDAY, THURSDAY, AND SATURDAY 54 capsule 3    Calcium Carbonate-Vitamin D (CALCIUM 600+D PO) Take by mouth      Cholecalciferol (VITAMIN D3) 5000 units CAPS Take by mouth      cyanocobalamin (VITAMIN B-12) 1,000 mcg tablet Take 1,000 mcg by mouth Monday, Wednesday, and Friday.      dextroamphetamine (DEXEDRINE SPANSULE) 15 MG 24 hr capsule Take 1 capsule (15 mg total) by mouth daily Max Daily Amount: 15 mg 90 capsule 0    folic acid (FOLVITE) 1 mg tablet Take 1 mg by mouth daily      levothyroxine 100 mcg tablet TAKE 1 TABLET BY MOUTH 6  DAYS OF THE WEEK AND HALF TAB ON SUNDAY 90 tablet 3    Biotin 5000 MCG CAPS Take 1 capsule by mouth daily (Patient not taking: Reported on 7/20/2022)      fluticasone (FLONASE) 50 mcg/act nasal spray 1 spray ech nostril (Patient not taking: Reported on 7/20/2022) 1 Bottle 5    Melatonin 10 MG TBDP Take by mouth (Patient not taking: Reported on 7/29/2021)      midodrine (PROAMATINE) 10 MG tablet Take 1 tablet (10 mg total) by mouth 3 (three) times a day (Patient not taking: Reported on 7/20/2022) 90 tablet 5     No current facility-administered medications for this visit.     Allergies   Allergen Reactions    Bee Venom Shortness Of Breath    Penicillins Other (See Comments)     Reaction unknown       Objective   Vitals: Blood pressure 126/78, pulse 86, height 5' 2\" (1.575 m), weight 80.1 kg (176 lb 9.6 oz), SpO2 97%.    Physical Exam  Vitals reviewed.   Constitutional:       Appearance: She is well-developed.   HENT:      Head: Normocephalic and atraumatic.   Eyes:      Conjunctiva/sclera: Conjunctivae normal.      Pupils: Pupils are equal, round, and reactive to light.   Cardiovascular:      Rate and Rhythm: Normal rate and regular rhythm.      Heart sounds: Normal heart sounds.   Pulmonary:      Effort: Pulmonary effort is normal.      Breath sounds: Normal breath " "sounds.   Abdominal:      General: Bowel sounds are normal.      Palpations: Abdomen is soft.   Musculoskeletal:         General: Normal range of motion.      Cervical back: Normal range of motion and neck supple.   Skin:     General: Skin is warm and dry.   Neurological:      Mental Status: She is alert and oriented to person, place, and time.   Psychiatric:         Behavior: Behavior normal.         Thought Content: Thought content normal.         Judgment: Judgment normal.         Lab Results:   Lab Results   Component Value Date/Time    Free t4 1.65 02/08/2024 10:02 AM    Free t4 1.51 07/10/2023 10:20 AM       Portions of the record may have been created with voice recognition software. Occasional wrong word or \"sound a like\" substitutions may have occurred due to the inherent limitations of voice recognition software. Read the chart carefully and recognize, using context, where substitutions have occurred.    "

## 2024-03-24 DIAGNOSIS — G90.A POTS (POSTURAL ORTHOSTATIC TACHYCARDIA SYNDROME): ICD-10-CM

## 2024-03-25 RX ORDER — DEXTROAMPHETAMINE SULFATE 15 MG/1
15 CAPSULE, EXTENDED RELEASE ORAL DAILY
Qty: 90 CAPSULE | Refills: 0 | Status: SHIPPED | OUTPATIENT
Start: 2024-03-25

## 2024-03-25 NOTE — TELEPHONE ENCOUNTER
Medication Refill Request     Name dextroamphetamine (DEXEDRINE SPANSULE) 15 MG 24 hr capsule    Dose/Frequency Take 1 capsule (15 mg total) by mouth daily Max Daily Amount: 15 mg   Quantity 90 capsule    Verified pharmacy   [x]  Verified ordering Provider   [x]  Does patient have enough for the next 3 days? Yes [x] No []

## 2024-03-25 NOTE — TELEPHONE ENCOUNTER
Requested medication(s) are due for refill today: Yes  Patient has already received a courtesy refill: No  Other reason request has been forwarded to provider: Guidelines failed.

## 2024-06-20 DIAGNOSIS — G90.A POTS (POSTURAL ORTHOSTATIC TACHYCARDIA SYNDROME): ICD-10-CM

## 2024-06-20 RX ORDER — DEXTROAMPHETAMINE SULFATE 15 MG/1
15 CAPSULE, EXTENDED RELEASE ORAL DAILY
Qty: 90 CAPSULE | Refills: 0 | Status: SHIPPED | OUTPATIENT
Start: 2024-06-20 | End: 2024-06-26 | Stop reason: SDUPTHER

## 2024-06-26 DIAGNOSIS — G90.A POTS (POSTURAL ORTHOSTATIC TACHYCARDIA SYNDROME): ICD-10-CM

## 2024-06-26 RX ORDER — DEXTROAMPHETAMINE SULFATE 15 MG/1
15 CAPSULE, EXTENDED RELEASE ORAL DAILY
Qty: 90 CAPSULE | Refills: 0 | Status: SHIPPED | OUTPATIENT
Start: 2024-06-26

## 2024-06-26 NOTE — TELEPHONE ENCOUNTER
Reason for call: Not a DUPLICATE! pt stated the the pharmacy will not fill the script if it doesn't have Dr Jamie Manuel on it.    [x] Refill   [] Prior Auth  [] Other:     Office:   [] PCP/Provider -   [x] Specialty/Provider -     Medication:     dextroamphetamine (DEXEDRINE SPANSULE) 15 MG 24 hr capsule       Dose/Frequency:  Take 1 capsule (15 mg total) by mouth daily     Quantity: 90 capsule     [address] [place] [address] [address] [phone]     Does the patient have enough for 3 days?   [] Yes   [x] No - Send as HP to POD

## 2024-06-26 NOTE — TELEPHONE ENCOUNTER
Refill must be reviewed and completed by the office or provider. The refill is unable to be approved or denied by the medication management team.    Refill cannot be delegated.   Medication: Dextroamphetamine (DEXEDRINE SPANSULE) 15 MG 24 hr capsule  PDMP

## 2024-07-16 LAB
25(OH)D3+25(OH)D2 SERPL-MCNC: 80.1 NG/ML (ref 30–100)
ALBUMIN SERPL-MCNC: 4.3 G/DL (ref 3.9–4.9)
ALP SERPL-CCNC: 81 IU/L (ref 44–121)
ALT SERPL-CCNC: 7 IU/L (ref 0–32)
AST SERPL-CCNC: 10 IU/L (ref 0–40)
BILIRUB SERPL-MCNC: 0.5 MG/DL (ref 0–1.2)
BUN SERPL-MCNC: 14 MG/DL (ref 8–27)
BUN/CREAT SERPL: 15 (ref 12–28)
CALCIUM SERPL-MCNC: 9.6 MG/DL (ref 8.7–10.3)
CHLORIDE SERPL-SCNC: 108 MMOL/L (ref 96–106)
CO2 SERPL-SCNC: 21 MMOL/L (ref 20–29)
CREAT SERPL-MCNC: 0.91 MG/DL (ref 0.57–1)
EGFR: 69 ML/MIN/1.73
GLOBULIN SER-MCNC: 2.4 G/DL (ref 1.5–4.5)
GLUCOSE SERPL-MCNC: 90 MG/DL (ref 70–99)
PHOSPHATE SERPL-MCNC: 3.7 MG/DL (ref 3–4.3)
POTASSIUM SERPL-SCNC: 4.7 MMOL/L (ref 3.5–5.2)
PROT SERPL-MCNC: 6.7 G/DL (ref 6–8.5)
PTH-INTACT SERPL-MCNC: 8 PG/ML (ref 15–65)
SODIUM SERPL-SCNC: 145 MMOL/L (ref 134–144)
T4 FREE SERPL-MCNC: 1.64 NG/DL (ref 0.82–1.77)
TSH SERPL DL<=0.005 MIU/L-ACNC: 1.15 UIU/ML (ref 0.45–4.5)

## 2024-08-17 ENCOUNTER — HOSPITAL ENCOUNTER (OUTPATIENT)
Dept: HOSPITAL 99 - WDC | Age: 69
End: 2024-08-17
Payer: COMMERCIAL

## 2024-08-17 DIAGNOSIS — Z12.31: Primary | ICD-10-CM

## 2024-08-21 ENCOUNTER — OFFICE VISIT (OUTPATIENT)
Dept: ENDOCRINOLOGY | Facility: HOSPITAL | Age: 69
End: 2024-08-21
Payer: COMMERCIAL

## 2024-08-21 VITALS
BODY MASS INDEX: 31.65 KG/M2 | SYSTOLIC BLOOD PRESSURE: 104 MMHG | HEART RATE: 81 BPM | WEIGHT: 172 LBS | DIASTOLIC BLOOD PRESSURE: 70 MMHG | HEIGHT: 62 IN

## 2024-08-21 DIAGNOSIS — E20.9 HYPOPARATHYROIDISM, UNSPECIFIED HYPOPARATHYROIDISM TYPE (HCC): ICD-10-CM

## 2024-08-21 DIAGNOSIS — E89.0 POSTOPERATIVE HYPOTHYROIDISM: Primary | ICD-10-CM

## 2024-08-21 PROCEDURE — 99214 OFFICE O/P EST MOD 30 MIN: CPT | Performed by: NURSE PRACTITIONER

## 2024-08-21 RX ORDER — CALCITRIOL 0.25 UG/1
CAPSULE, LIQUID FILLED ORAL
Qty: 54 CAPSULE | Refills: 3 | Status: SHIPPED | OUTPATIENT
Start: 2024-08-21

## 2024-08-21 NOTE — PROGRESS NOTES
Imani Luciano 69 y.o. female MRN: [id number]    Encounter: [id number]      Assessment & Plan     Assessment:  This is a 69 y.o.-year-old female with hypothyroidism and hypoparathyroidism.     Plan:  1.  Postoperative hypothyroidism: TSH is normal.  Generally, she feels well.  She will continue levothyroxine 100 mcg daily Monday through Saturday with 1/2 tablet on Sunday. Titration of her levothyroxine dose may take place upon review of updated lab work.     2.  Hypoparathyroidism:  She is currently taking on calcitriol 0.25 mcg - 4 days a week and calcium 600 mg once a daily.   Her most recent calcium level is normal.  For now, she will continue Calcitriol 0.25 mcg every other day with calcium.  She will contact the office if she develops any worsening muscle cramping, numbness, tingling, perioral paresthesias or difficulty breathing.    CC: Hypothyroidism/Hypoparathyroidism follow-up     History of Present Illness     HPI:  69 y.o. year old female with history of hypothyroidism who presents for follow-up. She had a history of goiter and thyroid nodules reportedly that eventually underwent surgery for neck compressive symptoms at [place] in February 2017.  She then was treated for postoperative hypothyroidism with levothyroxine 100 mcg daily with a half tablet on Sunday.  Her most recent TSH from July 15, 2024 is 1.150 with a free T4 of 1.64.   Overall, she feels well but notes chronic joint aches, pains, muscle cramping at times.   She denies any anterior neck discomfort, dysphagia or dysphonia.       She is also taking calcitriol 0.25 mcg on Sunday, Tuesday, Thursday and Saturday, as well as, calcium 600 mg every evening with vitamin-D daily along with 5000 units of vitamin-D daily for postoperative hypoparathyroidism.   Her most recent calcium from July 15, 2024 is 9.6 with an albumin of 4.3 and PTH is 8.0.  Recent phosphorus level was 4.2.  Her vitamin-D level from February 8, 2024 is 63.5. She denies any  aresthesias, perioral paresthesias or shortness of breath.    Review of Systems   Constitutional: Negative.  Negative for chills, fatigue and fever.   HENT: Negative.  Negative for trouble swallowing and voice change.    Eyes: Negative.  Negative for photophobia, pain, discharge, redness, itching and visual disturbance.   Respiratory: Negative.  Negative for chest tightness and shortness of breath.    Cardiovascular: Negative.  Negative for chest pain.   Gastrointestinal: Negative.  Negative for abdominal pain, constipation, diarrhea and vomiting.   Endocrine: Negative for cold intolerance, heat intolerance, polydipsia, polyphagia and polyuria.   Genitourinary: Negative.    Musculoskeletal: Negative.    Skin: Negative.    Allergic/Immunologic: Negative.    Neurological: Negative.  Negative for dizziness, syncope, light-headedness and headaches.   Hematological: Negative.    Psychiatric/Behavioral: Negative.     All other systems reviewed and are negative.      Historical Information   History reviewed. No pertinent past medical history.  History reviewed. No pertinent surgical history.  Social History   Social History     Substance and Sexual Activity   Alcohol Use None     Social History     Substance and Sexual Activity   Drug Use Not on file     Social History     Tobacco Use   Smoking Status Every Day    Current packs/day: 0.33    Average packs/day: 0.3 packs/day for 36.6 years (12.1 ttl pk-yrs)    Types: Cigarettes    Start date: 1988   Smokeless Tobacco Never     Family History:   Family History   Problem Relation Age of Onset    Arthritis Mother     Melanoma Mother     Cancer Mother     Hypertension Mother     Colon cancer Father     Heart attack Brother     Hypertension Brother        Meds/Allergies   Current Outpatient Medications   Medication Sig Dispense Refill    Ascorbic Acid (VITAMIN C) 500 MG CHEW Chew 500 mg daily      aspirin (ECOTRIN LOW STRENGTH) 81 mg EC tablet Take 81 mg by mouth daily       "calcitriol (ROCALTROL) 0.25 mcg capsule TAKE 1 CAPSULE BY MOUTH ON SUNDAY, TUESDAY, THURSDAY AND SATURDAY 54 capsule 3    Calcium Carbonate-Vitamin D (CALCIUM 600+D PO) Take by mouth      Cholecalciferol (VITAMIN D3) 5000 units CAPS Take by mouth      cyanocobalamin (VITAMIN B-12) 1,000 mcg tablet Take 1,000 mcg by mouth Monday, Wednesday, and Friday.      dextroamphetamine (DEXEDRINE SPANSULE) 15 MG 24 hr capsule Take 1 capsule (15 mg total) by mouth daily Max Daily Amount: 15 mg 90 capsule 0    folic acid (FOLVITE) 1 mg tablet Take 1 mg by mouth daily      levothyroxine 100 mcg tablet TAKE 1 TABLET BY MOUTH 6  DAYS OF THE WEEK AND HALF TAB ON SUNDAY 90 tablet 3    Biotin 5000 MCG CAPS Take 1 capsule by mouth daily (Patient not taking: Reported on 7/20/2022)      fluticasone (FLONASE) 50 mcg/act nasal spray 1 spray ech nostril (Patient not taking: Reported on 7/20/2022) 1 Bottle 5    Melatonin 10 MG TBDP Take by mouth (Patient not taking: Reported on 7/29/2021)      midodrine (PROAMATINE) 10 MG tablet Take 1 tablet (10 mg total) by mouth 3 (three) times a day (Patient not taking: Reported on 7/20/2022) 90 tablet 5     No current facility-administered medications for this visit.     Allergies   Allergen Reactions    Bee Venom Shortness Of Breath    Penicillins Other (See Comments)     Reaction unknown       Objective   Vitals: Blood pressure 104/70, pulse 81, height 5' 2\" (1.575 m), weight 78 kg (172 lb).    Physical Exam  Vitals reviewed.   Constitutional:       Appearance: She is well-developed.   HENT:      Head: Normocephalic and atraumatic.   Eyes:      Conjunctiva/sclera: Conjunctivae normal.      Pupils: Pupils are equal, round, and reactive to light.   Cardiovascular:      Rate and Rhythm: Normal rate and regular rhythm.      Heart sounds: Normal heart sounds.   Pulmonary:      Effort: Pulmonary effort is normal.      Breath sounds: Normal breath sounds.   Abdominal:      General: Bowel sounds are normal.    " "  Palpations: Abdomen is soft.   Musculoskeletal:         General: Normal range of motion.      Cervical back: Normal range of motion and neck supple.   Skin:     General: Skin is warm and dry.   Neurological:      Mental Status: She is alert and oriented to person, place, and time.   Psychiatric:         Behavior: Behavior normal.         Thought Content: Thought content normal.         Judgment: Judgment normal.         Lab Results:   Lab Results   Component Value Date/Time    Free t4 1.64 07/15/2024 10:19 AM    Free t4 1.65 02/08/2024 10:02 AM       Portions of the record may have been created with voice recognition software. Occasional wrong word or \"sound a like\" substitutions may have occurred due to the inherent limitations of voice recognition software. Read the chart carefully and recognize, using context, where substitutions have occurred.    "

## 2024-09-14 DIAGNOSIS — E89.0 POSTOPERATIVE HYPOTHYROIDISM: ICD-10-CM

## 2024-09-16 DIAGNOSIS — G90.A POTS (POSTURAL ORTHOSTATIC TACHYCARDIA SYNDROME): ICD-10-CM

## 2024-09-16 RX ORDER — LEVOTHYROXINE SODIUM 100 UG/1
TABLET ORAL
Qty: 90 TABLET | Refills: 1 | Status: SHIPPED | OUTPATIENT
Start: 2024-09-16

## 2024-09-16 NOTE — TELEPHONE ENCOUNTER
Reason for call:   [x] Refill   [] Prior Auth  [] Other:     Office:   [] PCP/Provider -   [x] Specialty/Provider - ENDOCRINOLOGY -  Mar Ayala MD     Medication: dextroamphetamine (DEXEDRINE SPANSULE) 15 MG 24 hr capsule     Dose/Frequency: Take 1 capsule (15 mg total) by mouth daily Max Daily Amount: 15 mg     Quantity: 90 capsules    Pharmacy: [place] [address] [address] [phone]    Does the patient have enough for 3 days?   [x] Yes   [] No - Send as HP to POD

## 2024-09-17 ENCOUNTER — TELEPHONE (OUTPATIENT)
Dept: OTHER | Facility: OTHER | Age: 69
End: 2024-09-17

## 2024-09-17 RX ORDER — DEXTROAMPHETAMINE SULFATE 15 MG/1
15 CAPSULE, EXTENDED RELEASE ORAL DAILY
Qty: 90 CAPSULE | Refills: 0 | Status: SHIPPED | OUTPATIENT
Start: 2024-09-17 | End: 2024-09-27 | Stop reason: SDUPTHER

## 2024-09-17 NOTE — TELEPHONE ENCOUNTER
Brain Pharmacist from [employer] is calling to report that  FALGUNI Jaeger does not have authority to prescribe type 2 medication . Please resend dextroamphetamine (DEXEDRINE SPANSULE) 15 MG 24 hr capsule. Please follow up in the a.m. Thank you in advance .

## 2024-09-19 NOTE — TELEPHONE ENCOUNTER
Pharmacy called back- they did not receive the script under Dr. Aylaa's name. Can this be resent? Please advise.

## 2024-09-24 DIAGNOSIS — G90.A POTS (POSTURAL ORTHOSTATIC TACHYCARDIA SYNDROME): ICD-10-CM

## 2024-09-24 RX ORDER — DEXTROAMPHETAMINE SULFATE 15 MG/1
15 CAPSULE, EXTENDED RELEASE ORAL DAILY
Qty: 90 CAPSULE | Refills: 1 | OUTPATIENT
Start: 2024-09-24

## 2024-09-24 NOTE — TELEPHONE ENCOUNTER
Patient said pharmacy did not receive this prescription    Reason for call:   [x] Refill   [] Prior Auth  [] Other:     Office:   [] PCP/Provider -   [x] Specialty/Provider - Endo     Medication: dextroamphetamine (DEXEDRINE SPANSULE) 15 MG 24 hr capsule     Dose/Frequency: Take 1 capsule (15 mg total) by mouth daily     Quantity: 90    Pharmacy: [place] [id number] - [place] - [address]      Does the patient have enough for 3 days?   [] Yes   [x] No - Send as HP to POD

## 2024-09-24 NOTE — TELEPHONE ENCOUNTER
06/27/2024 06/26/2024 Dextroamphetamine Sulfate (Capsule, Extended Release) 90.0 90 15 MG NA [id number] Commercial Insurance 0 / 0 [place]   1 [id number] 03/25/2024 03/25/2024 Dextroamphetamine Sulfate (Capsule, Extended Release) 90.0 90 [id number] Commercial Insurance 0 / 0 [place]   1 [id number] 12/22/2023 12/21/2023 Dextroamphetamine Sulfate (Capsule, Extended Release) 90.0 90 15 [id number] Commercial Insurance 0 / 0 [place]   1 [id number] 09/26/2023 09/26/2023 Dextroamphetamine Sulfate (Capsule, Extended Release) 90.0 90 15 MG NA [id number] Commercial Insurance

## 2024-09-27 DIAGNOSIS — G90.A POTS (POSTURAL ORTHOSTATIC TACHYCARDIA SYNDROME): ICD-10-CM

## 2024-09-27 RX ORDER — DEXTROAMPHETAMINE SULFATE 15 MG/1
15 CAPSULE, EXTENDED RELEASE ORAL DAILY
Qty: 30 CAPSULE | Refills: 0 | Status: SHIPPED | OUTPATIENT
Start: 2024-09-27

## 2024-09-27 RX ORDER — DEXTROAMPHETAMINE SULFATE 15 MG/1
15 CAPSULE, EXTENDED RELEASE ORAL DAILY
Qty: 90 CAPSULE | Refills: 0 | OUTPATIENT
Start: 2024-09-27

## 2024-09-27 NOTE — TELEPHONE ENCOUNTER
Not a Duplicate - Pharmacy will not fill medication under Doctor Harshil name. Doctor Jamie has been authorizing medication since 2023. Patient has requested medication since last Friday and is now without medication. Please send as soon as available.     Reason for call:   [x] Refill   [] Prior Auth  [] Other:     Office:   [] PCP/Provider -   [x] Specialty/Provider - Mar Ayala MD / fe [facility] [place]    Medication: dextroamphetamine (DEXEDRINE SPANSULE) 15 MG 24 hr capsule / Take 1 capsule (15 mg total) by mouth daily     [facility] [address] [place] [address] [address]     Does the patient have enough for 3 days?   [] Yes   [x] No - Send as HP to POD

## 2024-09-27 NOTE — TELEPHONE ENCOUNTER
This is not a duplicate.   Pharmacy will not fill medication under Doctor Harshil name. Doctor Jamie has been authorizing medication since 2023. Patient has requested medication since last Friday and is now without medication. Please send as soon as available.

## 2024-09-27 NOTE — TELEPHONE ENCOUNTER
06/27/2024 06/26/2024 Dextroamphetamine Sulfate (Capsule, Extended Release) 90.0 90 15 MG NA [id number] Commercial Insurance 0 / 0 [place]   1 [id number] 03/25/2024 03/25/2024 Dextroamphetamine Sulfate (Capsule, Extended Release) 90.0 90 15 MG NA [id number] Commercial Insurance 0 / 0 [place]   1 [id number] 12/22/2023 12/21/2023 Dextroamphetamine Sulfate (Capsule, Extended Release) 90.0 90 15 MG NA [id number] Commercial Insurance 0 / 0

## 2024-10-22 DIAGNOSIS — G90.A POTS (POSTURAL ORTHOSTATIC TACHYCARDIA SYNDROME): ICD-10-CM

## 2024-10-22 NOTE — TELEPHONE ENCOUNTER
Reason for call:   [x] Refill   [] Prior Auth  [] Other:     Office:   [] PCP/Provider -   [x] Specialty/Provider - Dr. Ayala    Medication: dextroamphetamine (DEXEDRINE SPANSULE) 15 MG       Dose/Frequency:  Take 1 capsule (15 mg total) by mouth daily Max Daily Amount: 15 mg     Quantity: 90    Pharmacy: [address] [place] - [address]        Does the patient have enough for 3 days?   [x] Yes   [] No - Send as HP to POD

## 2024-10-23 RX ORDER — DEXTROAMPHETAMINE SULFATE 15 MG/1
15 CAPSULE, EXTENDED RELEASE ORAL DAILY
Qty: 90 CAPSULE | Refills: 1 | OUTPATIENT
Start: 2024-10-23

## 2024-10-25 DIAGNOSIS — G90.A POTS (POSTURAL ORTHOSTATIC TACHYCARDIA SYNDROME): ICD-10-CM

## 2024-10-25 RX ORDER — DEXTROAMPHETAMINE SULFATE 15 MG/1
15 CAPSULE, EXTENDED RELEASE ORAL DAILY
Qty: 90 CAPSULE | Refills: 0 | Status: SHIPPED | OUTPATIENT
Start: 2024-10-25

## 2024-10-25 RX ORDER — DEXTROAMPHETAMINE SULFATE 15 MG/1
15 CAPSULE, EXTENDED RELEASE ORAL DAILY
Qty: 30 CAPSULE | Refills: 0 | Status: SHIPPED | OUTPATIENT
Start: 2024-10-25 | End: 2024-10-25 | Stop reason: SDUPTHER

## 2024-10-25 NOTE — TELEPHONE ENCOUNTER
Patient has been waiting for the medication dextroamphetamine (DEXEDRINE SPANSULE) 15 MG 24 hr capsule   To be sent to [facility] pharmacy since Oct 23, she is asking if this can be expedited    Last message shows Sergey Chua was not able to fill this medication .     Previous notes     Pharmacy will not fill medication under Doctor Harshil name. Must be under Doctor Jamie has been authorizing medication since 2023.

## 2024-10-25 NOTE — TELEPHONE ENCOUNTER
I can only approve 30 days and beyond this she will have to see her PCP or cardiology for this. Not a medication I prescribe, Im sorry. Endocrine does not manage POTS

## 2024-12-09 ENCOUNTER — DOCUMENTATION (OUTPATIENT)
Dept: ENDOCRINOLOGY | Facility: HOSPITAL | Age: 69
End: 2024-12-09

## 2025-01-29 ENCOUNTER — HOSPITAL ENCOUNTER (INPATIENT)
Dept: HOSPITAL 99 - IVU | Age: 70
LOS: 5 days | Discharge: HOME | DRG: 286 | End: 2025-02-03
Payer: SELF-PAY

## 2025-01-29 VITALS — RESPIRATION RATE: 18 BRPM

## 2025-01-29 VITALS — RESPIRATION RATE: 15 BRPM | DIASTOLIC BLOOD PRESSURE: 51 MMHG | SYSTOLIC BLOOD PRESSURE: 120 MMHG

## 2025-01-29 VITALS — RESPIRATION RATE: 18 BRPM | SYSTOLIC BLOOD PRESSURE: 93 MMHG | DIASTOLIC BLOOD PRESSURE: 83 MMHG

## 2025-01-29 VITALS — SYSTOLIC BLOOD PRESSURE: 92 MMHG | RESPIRATION RATE: 18 BRPM | DIASTOLIC BLOOD PRESSURE: 62 MMHG

## 2025-01-29 VITALS — RESPIRATION RATE: 20 BRPM | DIASTOLIC BLOOD PRESSURE: 49 MMHG | SYSTOLIC BLOOD PRESSURE: 96 MMHG

## 2025-01-29 VITALS — RESPIRATION RATE: 18 BRPM | DIASTOLIC BLOOD PRESSURE: 60 MMHG | SYSTOLIC BLOOD PRESSURE: 96 MMHG

## 2025-01-29 VITALS — RESPIRATION RATE: 14 BRPM

## 2025-01-29 VITALS — RESPIRATION RATE: 15 BRPM | SYSTOLIC BLOOD PRESSURE: 110 MMHG | DIASTOLIC BLOOD PRESSURE: 68 MMHG

## 2025-01-29 VITALS — SYSTOLIC BLOOD PRESSURE: 121 MMHG | DIASTOLIC BLOOD PRESSURE: 64 MMHG | RESPIRATION RATE: 18 BRPM

## 2025-01-29 VITALS — BODY MASS INDEX: 31 KG/M2 | BODY MASS INDEX: 30.6 KG/M2 | BODY MASS INDEX: 30.8 KG/M2

## 2025-01-29 VITALS — RESPIRATION RATE: 17 BRPM

## 2025-01-29 VITALS — DIASTOLIC BLOOD PRESSURE: 68 MMHG | RESPIRATION RATE: 16 BRPM | SYSTOLIC BLOOD PRESSURE: 115 MMHG

## 2025-01-29 VITALS — RESPIRATION RATE: 16 BRPM

## 2025-01-29 VITALS — DIASTOLIC BLOOD PRESSURE: 55 MMHG | RESPIRATION RATE: 23 BRPM | SYSTOLIC BLOOD PRESSURE: 109 MMHG

## 2025-01-29 VITALS — DIASTOLIC BLOOD PRESSURE: 56 MMHG | RESPIRATION RATE: 19 BRPM | SYSTOLIC BLOOD PRESSURE: 116 MMHG

## 2025-01-29 VITALS — RESPIRATION RATE: 20 BRPM

## 2025-01-29 VITALS — SYSTOLIC BLOOD PRESSURE: 116 MMHG | RESPIRATION RATE: 20 BRPM | DIASTOLIC BLOOD PRESSURE: 59 MMHG

## 2025-01-29 VITALS — SYSTOLIC BLOOD PRESSURE: 117 MMHG | RESPIRATION RATE: 16 BRPM | DIASTOLIC BLOOD PRESSURE: 65 MMHG

## 2025-01-29 VITALS — RESPIRATION RATE: 22 BRPM

## 2025-01-29 VITALS — RESPIRATION RATE: 24 BRPM

## 2025-01-29 VITALS — RESPIRATION RATE: 27 BRPM

## 2025-01-29 DIAGNOSIS — F17.200: ICD-10-CM

## 2025-01-29 DIAGNOSIS — I11.0: Primary | ICD-10-CM

## 2025-01-29 DIAGNOSIS — I95.1: ICD-10-CM

## 2025-01-29 DIAGNOSIS — I47.20: ICD-10-CM

## 2025-01-29 DIAGNOSIS — I50.21: ICD-10-CM

## 2025-01-29 DIAGNOSIS — I44.7: ICD-10-CM

## 2025-01-29 DIAGNOSIS — I42.0: ICD-10-CM

## 2025-01-29 DIAGNOSIS — Z90.5: ICD-10-CM

## 2025-01-29 DIAGNOSIS — E66.9: ICD-10-CM

## 2025-01-29 DIAGNOSIS — G90.A: ICD-10-CM

## 2025-01-29 DIAGNOSIS — E78.5: ICD-10-CM

## 2025-01-29 DIAGNOSIS — E89.0: ICD-10-CM

## 2025-01-29 LAB
ALBUMIN SERPL-MCNC: 4.4 G/DL (ref 3.5–5)
ALP SERPL-CCNC: 79 U/L (ref 38–126)
ALT SERPL-CCNC: 12 U/L (ref 0–35)
AST SERPL-CCNC: 20 U/L (ref 14–36)
BUN SERPL-MCNC: 17 MG/DL (ref 7–17)
CALCIUM SERPL-MCNC: 9.4 MG/DL (ref 8.4–10.2)
CHLORIDE SERPL-SCNC: 106 MMOL/L (ref 98–107)
CO2 SERPL-SCNC: 25 MMOL/L (ref 22–30)
EGFR: > 60
ERYTHROCYTE [DISTWIDTH] IN BLOOD BY AUTOMATED COUNT: 13.9 % (ref 11.5–14.5)
GLUCOSE SERPL-MCNC: 99 MG/DL (ref 70–99)
HCT VFR BLD AUTO: 44.8 % (ref 37–47)
HGB BLD-MCNC: 14.9 G/DL (ref 12–16)
INR PPP: 0.93
MCHC RBC AUTO-ENTMCNC: 33.3 G/DL (ref 33–37)
MCV RBC AUTO: 89.1 FL (ref 81–99)
NRBC BLD AUTO-RTO: 0 %
PLATELET # BLD AUTO: 239 10^3/UL (ref 130–400)
POTASSIUM SERPL-SCNC: 4.1 MMOL/L (ref 3.5–5.1)
PROT SERPL-MCNC: 7.1 G/DL (ref 6.3–8.2)
PROTHROMBIN TIME: 12.9 SEC (ref 11.4–14.6)
SODIUM SERPL-SCNC: 139 MMOL/L (ref 135–145)
TROPONIN I SERPL-MCNC: < 0.012 NG/ML
TROPONIN I SERPL-MCNC: < 0.012 NG/ML

## 2025-01-29 PROCEDURE — C1894 INTRO/SHEATH, NON-LASER: HCPCS

## 2025-01-29 RX ADMIN — FUROSEMIDE 20 MG: 10 INJECTION, SOLUTION INTRAMUSCULAR; INTRAVENOUS at 15:54

## 2025-01-29 NOTE — PTCARENOTE
Patient received at 1900, AAOX4 able to make needs known. IV flushed and patent. Patient ambulatory to bathroom. Patient medicated per MAR, assessment documented in flowsheet.

## 2025-01-29 NOTE — HPS.HSE
"Addendum entered and electronically signed by Pawel Valle MD  01/29/25 16:38: "~"I saw and examined the patient."~"The NP or PA's note was reviewed and I agree with the note."~"Comment: 69-year-old female with past medical history of hypothyroidism, POTS, congenital renal disease status post left nephrectomy came to the hospital with increasing dyspnea on exertion.  Patient had an echocardiogram today which showed severely "~"reduced EF of 10 to 15%.  Patient denies any recent upper respiratory infection or any fever.  Denies any chest pain.  Does reported that she had intermittent chest pain last year however they are now improved.  Seen by cardiology and plan for "~"cardiac catheterization tomorrow.  Dose of IV Lasix now and monitor response.  BNP pending. admit to telemetry"~"General: Comfortable and Conversant"~"HEENT: Anicteric and Moist mucous membranes"~"Respiratory: Rales (Bilateral Bases Right Greater than Left) and Non Labored Respirations"~"Cardiac: S1/S2 and Regular Rhythm"~"GI: Soft and Non Tender"~"Musculoskeletal:  No Edema"~"Neuro: Awake, Alert, Oriented and Nonfocal/grossly intact"~"Psych: Calm"~"I spent a total of  69    minutes with the patient or on the floor. More than 50% of this time involved counseling and coordination of care. "~"Original Note:"~"Family Physician"~"-"~"-: "~"Family Physician:  Crow Spencer"~"Chief Complaint"~"-"~"-: "~"Dyspnea on Exertion, Abnormal Echo"~"History of Present Illness"~"-: "~"Patient is a 69-year-old female past medical history of postsurgical hypothyroidism, hypoparathyroidism, and possible POTS who presents with dyspnea on exertion and abnormal echo as outpatient.  Patient reports has been experiencing increasing "~"dyspnea on exertion for at least the last 6 months.  She notes worsening symptoms going up the stairs or walking up an incline.  She has also been experiencing dizziness upon standing associated with hypotension.  She was started on "~"dextroamphetamine with improvement in symptoms.  Today she had an echocardiogram which revealed severly reduced EF 10-15% and was sent to the emergency department for evaluation. She reports remote history of chest pain several years ago but did not "~"go undergo any workup at that time.  She denies chest pain this recently.  She denies any recent illnesses."~"Medical History"~"Past Medical History"~"Past Medical History: Reports Other"~"Additional Past Medical History: "~"Hypoparathyroidism"~"Post-Surgical Hypothyroidism"~"Past Surgical History: Reports Other"~"Additional Past Surgical History: "~"Thyroidectomy"~"Left Nephrectomy"~"Social History"~"Tobacco: Former Smoker"~"Alcohol: Other (Very Rare per patient)"~"Family History"~"Family History: Not pertinent"~"Allergies / Home Medications"~"-: "~"Allergies reflects when Allergies were last updated in [facility]."~"Home Medications with original date entered in [facility] "~"Allergy/Medication List: "~"Allergies"~"Allergy/AdvReac	Type	Severity	Reaction	Status	Date / Time"~"bee venom protein (honey bee)	Allergy		Swelling	Verified	01/29/25 11:52"~"Penicillins	Allergy		Swelling	Verified	01/29/25 11:52"~"Home Medications"~"dextroamphetamine sulfate 15 mg capsule,extended release 15 mg PO DAILY 01/29/25 "~"levothyroxine 100 mcg tablet (Synthroid) 100 mcg PO DAILY 01/29/25 "~"Review of Systems"~"-"~"A 12 point ROS was completed and negative except as noted: Yes"~"Constitutional: Denies Fever or Chills"~"Respiratory: Reports Trouble Breathing; Denies Cough"~"Cardiac: Denies Chest Pain or Palpitations"~"Physical Exam"~"Vital Signs"~"-: "~"Vital Signs"~"Temp	Pulse	Resp	BP	Pulse Ox"~" 98.7 F 	 85 	 16 	 117/65 	 97 "~" 01/29/25 11:53	 01/29/25 15:50	 01/29/25 15:50	 01/29/25 15:50	 01/29/25 15:50"~"Physical Exam"~"General: Comfortable and Conversant"~"HEENT: Anicteric and Moist mucous membranes"~"Respiratory: Rales (Bilateral Bases Right Greater than Left) and Non Labored Respirations"~"Cardiac: S1/S2 and Regular Rhythm"~"GI: Soft and Non Tender"~"Rectal: Deferred by Provider"~"Musculoskeletal: No Clubbing, No Cyanosis and No Edema"~"Skin: Warm and Dry"~"Neuro: Awake, Alert, Oriented and Nonfocal/grossly intact"~"Psych: Calm"~"Laboratory Results"~"-"~"-: "~"01/29/25 12:05 "~"01/29/25 12:05 "~"Laboratory Results"~"Total Bilirubin	 0.6 mg/dl (0.2-1.3)  	01/29/25  12:05    "~"AST	 20 U/L (14-36)  	01/29/25  12:05    "~"ALT	 12 U/L (0-35)  	01/29/25  12:05    "~"Alkaline Phosphatase	 79 U/L ()  	01/29/25  12:05    "~"Troponin I	 &lt; 0.012 ng/ml 	01/29/25  12:05    "~"Data Reviewed"~"-"~"Diagnostic Radiology: Report Reviewed by me"~"Lab Data: Labs Reviewed by me"~"Old Records: Reviewed"~"Impression/Plan"~"-"~"-: "~"Newly-Diagnosed Cardiomyopathy"~"Acute Heart Failure with Reduced EF"~"-Consult Cardiology"~"-Echo Jan 2025: Left ventricle is severely dilated (7.2 cm). Severe global hypokinesis with septal dyskinesis. Estimated left ventricular ejection fraction is 10-15%."~"-Patient received Lasix 20mg IV in ED - Hold on further diuretics"~"-Cardiology planning for cardiac cath tomorrow"~"-Monitor Is&Os and Daily Weights"~"POTS"~"-Continue dextroamphetamine"~"Post-Surgical Hypothyroidism"~"-Continue levothyroxine"~"Hx Left Nephrectomy"~"DVT proph: Lovenox"~"Code Status: Full Code"

## 2025-01-30 VITALS — DIASTOLIC BLOOD PRESSURE: 58 MMHG | SYSTOLIC BLOOD PRESSURE: 102 MMHG | RESPIRATION RATE: 17 BRPM

## 2025-01-30 VITALS — RESPIRATION RATE: 18 BRPM

## 2025-01-30 VITALS — RESPIRATION RATE: 18 BRPM | DIASTOLIC BLOOD PRESSURE: 60 MMHG | SYSTOLIC BLOOD PRESSURE: 99 MMHG

## 2025-01-30 VITALS — SYSTOLIC BLOOD PRESSURE: 115 MMHG | DIASTOLIC BLOOD PRESSURE: 61 MMHG

## 2025-01-30 VITALS — SYSTOLIC BLOOD PRESSURE: 115 MMHG | RESPIRATION RATE: 18 BRPM | DIASTOLIC BLOOD PRESSURE: 58 MMHG

## 2025-01-30 VITALS — SYSTOLIC BLOOD PRESSURE: 101 MMHG | RESPIRATION RATE: 18 BRPM | DIASTOLIC BLOOD PRESSURE: 59 MMHG

## 2025-01-30 VITALS — RESPIRATION RATE: 20 BRPM

## 2025-01-30 VITALS — RESPIRATION RATE: 17 BRPM | DIASTOLIC BLOOD PRESSURE: 58 MMHG | SYSTOLIC BLOOD PRESSURE: 103 MMHG

## 2025-01-30 VITALS — SYSTOLIC BLOOD PRESSURE: 102 MMHG | DIASTOLIC BLOOD PRESSURE: 54 MMHG

## 2025-01-30 LAB
BUN SERPL-MCNC: 19 MG/DL (ref 7–17)
CALCIUM SERPL-MCNC: 9.2 MG/DL (ref 8.4–10.2)
CHLORIDE SERPL-SCNC: 105 MMOL/L (ref 98–107)
CO2 SERPL-SCNC: 25 MMOL/L (ref 22–30)
EGFR: > 60
ERYTHROCYTE [DISTWIDTH] IN BLOOD BY AUTOMATED COUNT: 13.8 % (ref 11.5–14.5)
ESTIMATED CREATININE CLEARANCE: 65 ML/MIN
GLUCOSE SERPL-MCNC: 95 MG/DL (ref 70–99)
HBA1C MFR BLD: 5.5 % (ref 4–5.6)
HCT VFR BLD AUTO: 44.3 % (ref 37–47)
HDLC SERPL-MCNC: 77 MG/DL
HGB BLD-MCNC: 15 G/DL (ref 12–16)
LDLC SERPL CALC-MCNC: 111 MG/DL
MAGNESIUM SERPL-MCNC: 1.8 MG/DL (ref 1.6–2.3)
MCHC RBC AUTO-ENTMCNC: 33.9 G/DL (ref 33–37)
MCV RBC AUTO: 87.2 FL (ref 81–99)
PLATELET # BLD AUTO: 227 10^3/UL (ref 130–400)
POTASSIUM SERPL-SCNC: 4.2 MMOL/L (ref 3.5–5.1)
SODIUM SERPL-SCNC: 142 MMOL/L (ref 135–145)
TSH SERPL DL<=0.05 MIU/L-ACNC: 5.21 UIU/ML (ref 0.47–4.68)
VLDLC SERPL CALC-MCNC: 19 MG/DL (ref 0–30)

## 2025-01-30 RX ADMIN — ENOXAPARIN SODIUM 40 MG: 40 INJECTION SUBCUTANEOUS at 17:51

## 2025-01-30 RX ADMIN — Medication 0.4 MG: at 17:51

## 2025-01-30 RX ADMIN — CALCITRIOL 0.25 MCG: 0.25 CAPSULE, LIQUID FILLED ORAL at 21:16

## 2025-01-30 RX ADMIN — ASPIRIN 325 MG: 325 TABLET ORAL at 15:30

## 2025-01-30 NOTE — W.PN.HOSP.TC
"Today's Communication/Plan"~"-"~"-: "~"Monitor vital signs "~"see plan"~"Cardiac cath today"~"Cardiology following"~"Assessment / Plan"~"Assessment / Plan"~"-: "~"General: Comfortable and Conversant"~"HEENT: Anicteric and Moist mucous membranes"~"Respiratory: Rales (Bilateral Bases Right Greater than Left) and Non Labored Respirations"~"Cardiac: S1/S2 and Regular Rhythm"~"GI: Soft and Non Tender"~"Musculoskeletal:  No Edema"~"Neuro: Awake, Alert, Oriented and Nonfocal/grossly intact"~"Psych: Calm"~"Newly-Diagnosed Cardiomyopathy"~"Acute Heart Failure with Reduced EF"~"-Consult Cardiology"~"-Echo Jan 2025: Left ventricle is severely dilated (7.2 cm). Severe global hypokinesis with septal dyskinesis. Estimated left ventricular ejection fraction is 10-15%."~"-Patient received Lasix 20mg IV in ED - defer to cardiology on further diuresis per catheterization"~"Cardiac cath 1/30"~"-Monitor Is&Os and Daily Weights"~"POTS"~"-hold dextroamphetamine"~"Post-Surgical Hypothyroidism"~"-Continue levothyroxine"~"Hx Left Nephrectomy 2/2 congenital renal disease"~"DVT proph: Lovenox"~"Code Status: Full Code"~"Anticipated Discharge: Within 24 hours"~"Subjective/Interval History"~"-"~"-: "~"Date of Service:  January 30, 2025"~"Denies pain"~"Objective Data"~"-"~"Labs: "~"Laboratory Results"~" 	01/30/25"~" 	04:17"~"WBC	 9.0"~"Hgb	 15.0"~"Hct	 44.3"~"Plt Count	 227"~"Sodium	 142"~"Potassium	 4.2"~"Chloride	 105"~"Carbon Dioxide	 25"~"BUN	 19 H"~"Creatinine	 0.8"~"Glucose	 95"~"Calcium	 9.2"~"Vital Signs: "~"Vital Signs"~"Temp	Pulse	Resp	BP	Pulse Ox"~" 98.0 F 	 76 	 17 	 102/58 	 95 "~" 01/30/25 11:02	 01/30/25 11:02	 01/30/25 11:02	 01/30/25 11:02	 01/30/25 11:02"~"I&O"~"	01/29/25	01/30/25	01/31/25"~"	06:59	06:59	06:59"~"Intake Total		960 / 960	"~"Balance		960 / 960	"

## 2025-01-30 NOTE — CM
spoke to pt and S.O. in cathlab holding area. she is prev indep, lives with her s.o. in a 2 story home with 1 step to enter. she has a cane and a walker to use if needed. she denies any dc planning needs. plan is for cardiac cath today.

## 2025-01-30 NOTE — PTCARENOTE
Received pt from the ED holding area in the cath lab.  Pt AAO x3, VSS.  Pt denies any chest pain.  Will monitor.

## 2025-01-30 NOTE — PTCARENOTE
"assumed care of patient at the change of shift. resting in bed comfortably. independent in the room. patient denies any sob/cp. SR with a BBB on tele-70s. bp stable. reviewed plan of care with patient and verbalized understanding. NPO at midnight "~"for cardiac cath in AM. call bell within reach. calls appropriately. "

## 2025-01-30 NOTE — W.PN.CD
"Addendum entered and electronically signed by Heath Ashley MD  01/30/25 17:11: "~"Patient seen and examined in collaboration with NP; agree with below."~"-Patient with new onset cardiomyopathy (dilated LV with EF 10-15%)."~"-Appears to be compensated on examination; will hold off on any additional Lasix today, especially given blood pressure limitations."~"-Patient will undergo right and left heart catheterization tomorrow."~"-Patient has an allergy to midodrine."~"-Would not resume Dexedrine--most likely cause of cardiomyopathy, given long-term use (20+ years)."~"-GDMT will be limited by blood pressure."~"-NPO after MN."~"Original Note:"~"Today's Communication / Plan"~"-"~"-: "~"Continue aspirin (I resumed it). Later GDMT as tolerated."~"Left and right heart cath tomorrow- NPO after MN"~"Impression / Plan"~"-"~"-: "~"69-year-old female (recently evaluated by Dr. Rios) with chronic hypotension (on chronic Dexedrine at home for years), congenital renal disease status-post left nephrectomy, hypothyroidism, obesity, and recently discovered left bundle branch "~"block who presented for an outpatient echocardiogram which revealed an LVEF of 10-15%; the patient has no previous known history of CHF, nor has she had any previous echocardiogram.  The patient states that she has been experiencing some chest "~"tightness/dyspnea and fatigue recently.  She denies palpitations or syncopal events.  She states that her mother and brother both had heart disease."~"Acute HFrEF (EF 10-15%):"~"-Dilated LV with severely reduced LVEF on echocardiogram; mild to moderate mitral regurgitation."~"-The patient will undergo right and left heart cardiac catheterization tomorrow to assess coronary status as well as filling pressures and cardiac output/index."~"-GDMT will be limited by blood pressure as the patient is on Dexedrine at home (now stopped)"~"-she received 1 dose of IV lasix. Her pressure is soft and she is not obviously volume overloaded to assessment, so I will hold off on ordering more today."~"-Further GDMT throughout hospitalization (SGLT2 inhibitor, etc.)."~"LBBB:"~"-Most likely secondary to underlying CHF."~"-Will undergo cardiac catheterization tomorrow as above."~"Chronic hypotension:"~"-Will monitor blood pressure closely with diuresis for now."~"-Recommend stopping Dexedrine."~"Hypothyroidism."~"-Continue Synthroid. TSH mildly elevated- management per primary team."~"Obesity:"~"-Weight loss recommended."~"Physical Exam"~"Vital Signs/Labs"~"-: "~"Vital Signs"~"Temp	Pulse	Resp	BP	Pulse Ox"~" 98.0 F 	 76 	 17 	 102/58 	 95 "~" 01/30/25 11:02	 01/30/25 11:02	 01/30/25 11:02	 01/30/25 11:02	 01/30/25 11:02"~"	01/29/25	01/30/25	01/31/25"~"	06:59	06:59	06:59"~"Actual Weight		76.748 kg	"~"01/30/25 04:17 "~"01/30/25 04:17 "~"PT	 12.9 Sec (11.4-14.6)  	01/29/25  18:32    "~"INR	 0.93  	01/29/25  18:32    "~"Magnesium	 1.8 mg/dl (1.6-2.3)  	01/30/25  04:17    "~"Triglycerides	 95 mg/dl ()  	01/30/25  04:17    "~"LDL Cholesterol, Calc	 111 mg/dl 	01/30/25  04:17    "~"VLDL Cholesterol, Calc	 19 mg/dl (0-30)  	01/30/25  04:17    "~"HDL Cholesterol	 77 mg/dl 	01/30/25  04:17    "~"TSH	 5.21 uIU/ml (0.47-4.68)  H 	01/30/25  04:17    "~" 	01/29/25	01/29/25"~" 	12:05	15:16"~"Pro-B-Natriuretic Pept	 6830	 Cancelled"~"LAB Results"~" 	01/29/25	01/29/25"~" 	12:05	18:32"~"Troponin I	 &lt; 0.012	 &lt; 0.012"~"Physical Exam"~"Constitutional: No acute distress"~"EENT: Anicteric"~"Cardiovascular: Rhythm & rate is regular and Pedal edema present (trace BLE)"~"Respiratory: Respiratory effort normal and Lungs clear to auscul."~"GI: Soft, Non tender and Normal bowel sounds"~"Neuro/Psych: AO x 3"~"Other: Skin (warm and dry)"~"Data Reviewed"~"-"~"-: "~"Date of Service:  January 30, 2025"~"EKG: Other (tele SR)"~"Labs: Labs Reviewed by me"

## 2025-01-31 VITALS — RESPIRATION RATE: 20 BRPM

## 2025-01-31 VITALS — DIASTOLIC BLOOD PRESSURE: 60 MMHG | SYSTOLIC BLOOD PRESSURE: 111 MMHG

## 2025-01-31 VITALS — SYSTOLIC BLOOD PRESSURE: 103 MMHG | DIASTOLIC BLOOD PRESSURE: 48 MMHG

## 2025-01-31 VITALS — DIASTOLIC BLOOD PRESSURE: 58 MMHG | SYSTOLIC BLOOD PRESSURE: 113 MMHG

## 2025-01-31 VITALS — RESPIRATION RATE: 18 BRPM

## 2025-01-31 VITALS — DIASTOLIC BLOOD PRESSURE: 48 MMHG | SYSTOLIC BLOOD PRESSURE: 110 MMHG

## 2025-01-31 VITALS — DIASTOLIC BLOOD PRESSURE: 44 MMHG | SYSTOLIC BLOOD PRESSURE: 91 MMHG

## 2025-01-31 VITALS — SYSTOLIC BLOOD PRESSURE: 99 MMHG | DIASTOLIC BLOOD PRESSURE: 79 MMHG

## 2025-01-31 VITALS — DIASTOLIC BLOOD PRESSURE: 56 MMHG | SYSTOLIC BLOOD PRESSURE: 105 MMHG

## 2025-01-31 VITALS — SYSTOLIC BLOOD PRESSURE: 103 MMHG | DIASTOLIC BLOOD PRESSURE: 38 MMHG

## 2025-01-31 VITALS — SYSTOLIC BLOOD PRESSURE: 103 MMHG | DIASTOLIC BLOOD PRESSURE: 49 MMHG

## 2025-01-31 VITALS — SYSTOLIC BLOOD PRESSURE: 108 MMHG | DIASTOLIC BLOOD PRESSURE: 53 MMHG

## 2025-01-31 VITALS — DIASTOLIC BLOOD PRESSURE: 69 MMHG | SYSTOLIC BLOOD PRESSURE: 89 MMHG

## 2025-01-31 VITALS — DIASTOLIC BLOOD PRESSURE: 47 MMHG | SYSTOLIC BLOOD PRESSURE: 106 MMHG

## 2025-01-31 VITALS — RESPIRATION RATE: 16 BRPM

## 2025-01-31 VITALS — DIASTOLIC BLOOD PRESSURE: 81 MMHG | SYSTOLIC BLOOD PRESSURE: 103 MMHG

## 2025-01-31 LAB
BUN SERPL-MCNC: 17 MG/DL (ref 7–17)
CALCIUM SERPL-MCNC: 9.6 MG/DL (ref 8.4–10.2)
CHLORIDE SERPL-SCNC: 106 MMOL/L (ref 98–107)
CO2 SERPL-SCNC: 24 MMOL/L (ref 22–30)
EGFR: > 60
ESTIMATED CREATININE CLEARANCE: 64 ML/MIN
GLUCOSE SERPL-MCNC: 88 MG/DL (ref 70–99)
POTASSIUM SERPL-SCNC: 4.2 MMOL/L (ref 3.5–5.1)
SODIUM SERPL-SCNC: 140 MMOL/L (ref 135–145)

## 2025-01-31 PROCEDURE — 4A023N8 MEASUREMENT OF CARDIAC SAMPLING AND PRESSURE, BILATERAL, PERCUTANEOUS APPROACH: ICD-10-PCS | Performed by: INTERNAL MEDICINE

## 2025-01-31 PROCEDURE — B2111ZZ FLUOROSCOPY OF MULTIPLE CORONARY ARTERIES USING LOW OSMOLAR CONTRAST: ICD-10-PCS | Performed by: INTERNAL MEDICINE

## 2025-01-31 RX ADMIN — CALCIUM 500 MG: 500 TABLET ORAL at 22:14

## 2025-01-31 RX ADMIN — METOPROLOL SUCCINATE 12.5 MG: 25 TABLET, EXTENDED RELEASE ORAL at 13:27

## 2025-01-31 RX ADMIN — ASPIRIN 81 MG: 81 TABLET, CHEWABLE ORAL at 09:02

## 2025-01-31 RX ADMIN — Medication 25 MCG: at 12:50

## 2025-01-31 RX ADMIN — ENOXAPARIN SODIUM 40 MG: 40 INJECTION SUBCUTANEOUS at 18:25

## 2025-01-31 RX ADMIN — LEVOTHYROXINE SODIUM 100 MCG: 0.1 TABLET ORAL at 07:17

## 2025-01-31 RX ADMIN — DAPAGLIFLOZIN 10 MG: 10 TABLET, FILM COATED ORAL at 12:50

## 2025-01-31 RX ADMIN — SODIUM CHLORIDE 1000: 900 INJECTION, SOLUTION INTRAVENOUS at 12:51

## 2025-01-31 RX ADMIN — CYANOCOBALAMIN TAB 1000 MCG 1000 MCG: 1000 TAB at 19:58

## 2025-01-31 RX ADMIN — Medication 0.4 MG: at 18:25

## 2025-01-31 NOTE — W.PN.HOSP.TC
"Today's Communication/Plan"~"-"~"-: "~"Monitor vital signs see plan"~"Cardiac catheterization"~"Cardiology following"~"Continue aspirin, Farxiga, metoprolol"~"Assessment / Plan"~"Assessment / Plan"~"-: "~"General: Comfortable and Conversant"~"HEENT: Anicteric and Moist mucous membranes"~"Respiratory: Rales (Bilateral Bases Right Greater than Left) and Non Labored Respirations"~"Cardiac: S1/S2 and Regular Rhythm"~"GI: Soft and Non Tender"~"Musculoskeletal:  No Edema"~"Neuro: Awake, Alert, Oriented and Nonfocal/grossly intact"~"Psych: Calm"~"Newly-Diagnosed Cardiomyopathy"~"Acute Heart Failure with Reduced EF"~"-Consult Cardiology"~"-Echo Jan 2025: Left ventricle is severely dilated (7.2 cm). Severe global hypokinesis with septal dyskinesis. Estimated left ventricular ejection fraction is 10-15%."~"-Patient received Lasix 20mg IV in ED - defer to cardiology on further diuresis per catheterization"~"Cardiac cath 1/31 without any CAD however does have low cardiac index."~"-Monitor Is&Os and Daily Weights"~"Continue aspirin, farxiga added.   for cost"~"Metoprolol"~"POTS"~"-dc dextroamphetamine per cards"~"Post-Surgical Hypothyroidism"~"-Continue levothyroxine"~"Hx Left Nephrectomy 2/2 congenital renal disease"~"DVT proph: Lovenox"~"Code Status: Full Code"~"Anticipated Discharge: Within 24 hours"~"Subjective/Interval History"~"-"~"-: "~"Date of Service:  January 31, 2025"~"Denies shortness of breath"~"Objective Data"~"-"~"Labs: "~"Laboratory Results"~" 	01/31/25"~" 	04:23"~"Sodium	 140"~"Potassium	 4.2"~"Chloride	 106"~"Carbon Dioxide	 24"~"BUN	 17"~"Creatinine	 0.8"~"Glucose	 88"~"Calcium	 9.6"~"Vital Signs: "~"Vital Signs"~"Temp	Pulse	Resp	BP	Pulse Ox"~" 97.7 F 	 71 	 20 	 105/56 	 96 "~" 01/31/25 13:16	 01/31/25 13:27	 01/31/25 13:16	 01/31/25 13:27	 01/31/25 13:16"~"I&O"~"	01/30/25	01/31/25	02/01/25"~"	06:59	06:59	06:59"~"Intake Total	960 / 960	240 / 240	"~"Balance	960 / 960	240 / 240	"

## 2025-01-31 NOTE — PTCARENOTE
Pt noted to have a run of tachycardia at a rate up to 184.  Pt lying awake in bed and asymptomatic.  VSS.  Will monitor.

## 2025-01-31 NOTE — CM
"CM following for DC planning needs. "~"Antic. DC plan is for home, no needs. "~"Will cont. to follow. "

## 2025-01-31 NOTE — PTCARENOTE
Received pt post cath.  VSS.  Pt noted to be in NSR w/ BBB.  Right radial and right brachial sites intact w/ R band on right radial access site.  Pt denies chest pain or sob.  Orders noted.  Will monitor.

## 2025-01-31 NOTE — CM
"Priced both Farxiga + Jardiance thru patient's RX plan. "~"Patient is responsible for 25% of cost of medication, approx. $135-150/mo. "~"Coupon for Farxiga placed in chart for free 30 d. "~"I have notified pt. of cost, she is aware."~"TT to SYLVIA to update. "

## 2025-01-31 NOTE — W.PN.CD
"Today's Communication / Plan"~"-"~"-: "~"R/L heart catheterization today."~"Start dapagliflozin 10 mg daily."~"Case management consult for cost."~"GDMT titration post catheterization."~"Impression / Plan"~"-"~"-: "~"Impression/Plan:  69-year-old female (recently evaluated by Dr. Rios) with chronic hypotension (on chronic Dexedrine at home for years), congenital renal disease status-post left nephrectomy, hypothyroidism, obesity, and recently discovered "~"left bundle branch block who presented for an outpatient echocardiogram which revealed an LVEF of 10-15% with associated chest tightness/dyspnea, prompting hospitalization."~"#Acute HFrEF (EF 10-15%)"~"	-New diagnosis."~"	-Dilated LV with severely reduced LVEF (10-15%) on echocardiogram; mild to moderate mitral regurgitation."~"	-Right and left heart cardiac catheterization today."~"	-GDMT limited by blood pressure as the patient was on dextroamphetamine at home (now stopped as possible source of CMO)."~"	-Holding metoprolol succinate until we assess her filling pressures/compensation"~"	-Start dapagliflozin 10 mg daily.  Case management consult."~"#LBBB"~"	-New diagnosis."~"	-If CMO does not recover with GDMT in 3 months, she would benefit from CRT-D."~"#Hypotension"~"	-Chronic, previously treated with dextroamphetamine."~"	-Will monitor blood pressure closely with diuresis for now."~"	-Possible role for midodrine vs. droxidopa (typically only for orthostatic hypotension/autonomic hypotension)."~"#Hypothyroidism"~"	-Chronic, elevated TSH."~"	-Levothyroxine adjustment per primary team.  Does not seem high enough to be a source of her CMO."~"#HLD"~"	-New diagnosis."~"	-Total cholesterol = 207, LDL = 111, HDL = 77, Triglycerides = 95."~"	-We will discuss primary/secondary prevention after her coronary anatomy has been assessed."~"#Obesity"~"	-Weight loss recommended."~"Subjective/Interval History:"~"Weight down 0.4 kg."~"DATA:"~"Transthoracic Echocardiogram, 1/29/2025:"~"CONCLUSIONS"~" -Left ventricle is severely dilated (7.2 cm). Severe global hypokinesis with "~" septal dyskinesis. Estimated left ventricular ejection fraction is 10-15%."~" -Normal right ventricular size and function."~" -Moderately dilated left atrium."~" -Mild to moderate mitral regurgitation."~" -Trace tricuspid regurgitation. Estimated pulmonary artery pressure of 20-25 "~" mmHg."~" "~" No prior study available for comparison."~"Physical Exam"~"Vital Signs/Labs"~"-: "~"Vital Signs"~"Temp	Pulse	Resp	BP	Pulse Ox"~" 36.6 C 	 66 	 16 	 113/58 	 95 "~" 01/31/25 04:15	 01/31/25 05:00	 01/31/25 04:15	 01/31/25 04:23	 01/31/25 04:23"~"	01/29/25	01/30/25	01/31/25"~"	11:59	11:59	11:59"~"Actual Weight		76.748 kg	76.3 kg"~"01/30/25 04:17 "~"01/31/25 04:23 "~"PT	 12.9 Sec (11.4-14.6)  	01/29/25  18:32    "~"INR	 0.93  	01/29/25  18:32    "~"Magnesium	 1.8 mg/dl (1.6-2.3)  	01/30/25  04:17    "~"Triglycerides	 95 mg/dl ()  	01/30/25  04:17    "~"LDL Cholesterol, Calc	 111 mg/dl 	01/30/25  04:17    "~"VLDL Cholesterol, Calc	 19 mg/dl (0-30)  	01/30/25  04:17    "~"HDL Cholesterol	 77 mg/dl 	01/30/25  04:17    "~"TSH	 5.21 uIU/ml (0.47-4.68)  H 	01/30/25  04:17    "~" 	01/29/25	01/29/25"~" 	12:05	15:16"~"Pro-B-Natriuretic Pept	 6830	 Cancelled"~"LAB Results"~" 	01/29/25	01/29/25"~" 	12:05	18:32"~"Troponin I	 &lt; 0.012	 &lt; 0.012"~"Physical Exam"~"Constitutional: No acute distress and Comfortable"~"EENT: Anicteric and Moist mucous membranes"~"Cardiovascular: Rhythm & rate is regular, Pedal edema is absent, JVD pressure is normal, Systolic murmur absent and Diastolic murmur absent"~"Respiratory: Respiratory effort normal, Lungs clear to auscul., Wheeze Absent, Crackles Absent and Rhonchi Absent"~"GI: Soft, Distention absent, Flat, Non tender and Normal bowel sounds"~"Neuro/Psych: AO x 3"~"Data Reviewed"~"-"~"-: "~"Date of Service:  January 31, 2025"~"Medical Decision Making: Reviewed Test Results, Independent Historian Assessment and Test Interpretation"~"EKG: Tracing Personally Visualized and interpreted and Report Reviewed by me"~"Echo: Tracing Personally Visualized and interpreted and Report Reviewed by me"~"X-Ray/CT/US/MRI/NUC/PET: Image Personally Visualized and interpreted and Report Reviewed by me"~"Labs: Labs Reviewed by me"~"Old Records: Reviewed"

## 2025-02-01 VITALS — DIASTOLIC BLOOD PRESSURE: 47 MMHG | SYSTOLIC BLOOD PRESSURE: 81 MMHG

## 2025-02-01 VITALS — SYSTOLIC BLOOD PRESSURE: 104 MMHG | DIASTOLIC BLOOD PRESSURE: 67 MMHG | HEART RATE: 80 BPM

## 2025-02-01 VITALS — DIASTOLIC BLOOD PRESSURE: 50 MMHG | SYSTOLIC BLOOD PRESSURE: 87 MMHG

## 2025-02-01 VITALS — DIASTOLIC BLOOD PRESSURE: 40 MMHG | SYSTOLIC BLOOD PRESSURE: 93 MMHG

## 2025-02-01 VITALS — RESPIRATION RATE: 16 BRPM

## 2025-02-01 VITALS — SYSTOLIC BLOOD PRESSURE: 94 MMHG | DIASTOLIC BLOOD PRESSURE: 59 MMHG

## 2025-02-01 VITALS — DIASTOLIC BLOOD PRESSURE: 53 MMHG | SYSTOLIC BLOOD PRESSURE: 90 MMHG

## 2025-02-01 VITALS — SYSTOLIC BLOOD PRESSURE: 104 MMHG | DIASTOLIC BLOOD PRESSURE: 63 MMHG

## 2025-02-01 VITALS — DIASTOLIC BLOOD PRESSURE: 48 MMHG | SYSTOLIC BLOOD PRESSURE: 95 MMHG

## 2025-02-01 VITALS — RESPIRATION RATE: 20 BRPM

## 2025-02-01 VITALS — SYSTOLIC BLOOD PRESSURE: 97 MMHG | DIASTOLIC BLOOD PRESSURE: 50 MMHG

## 2025-02-01 VITALS — OXYGEN SATURATION: 98 %

## 2025-02-01 VITALS — SYSTOLIC BLOOD PRESSURE: 83 MMHG | DIASTOLIC BLOOD PRESSURE: 57 MMHG

## 2025-02-01 VITALS — DIASTOLIC BLOOD PRESSURE: 53 MMHG | SYSTOLIC BLOOD PRESSURE: 89 MMHG

## 2025-02-01 VITALS — SYSTOLIC BLOOD PRESSURE: 108 MMHG | DIASTOLIC BLOOD PRESSURE: 58 MMHG

## 2025-02-01 VITALS — SYSTOLIC BLOOD PRESSURE: 104 MMHG | DIASTOLIC BLOOD PRESSURE: 67 MMHG

## 2025-02-01 LAB
BUN SERPL-MCNC: 21 MG/DL (ref 7–17)
CALCIUM SERPL-MCNC: 9.6 MG/DL (ref 8.4–10.2)
CHLORIDE SERPL-SCNC: 106 MMOL/L (ref 98–107)
CO2 SERPL-SCNC: 22 MMOL/L (ref 22–30)
EGFR: > 60
ERYTHROCYTE [DISTWIDTH] IN BLOOD BY AUTOMATED COUNT: 13.4 % (ref 11.5–14.5)
ESTIMATED CREATININE CLEARANCE: 57 ML/MIN
GLUCOSE SERPL-MCNC: 93 MG/DL (ref 70–99)
HCT VFR BLD AUTO: 42.2 % (ref 37–47)
HGB BLD-MCNC: 14.5 G/DL (ref 12–16)
MCHC RBC AUTO-ENTMCNC: 34.4 G/DL (ref 33–37)
MCV RBC AUTO: 86.7 FL (ref 81–99)
PLATELET # BLD AUTO: 222 10^3/UL (ref 130–400)
POTASSIUM SERPL-SCNC: 4 MMOL/L (ref 3.5–5.1)
SODIUM SERPL-SCNC: 138 MMOL/L (ref 135–145)

## 2025-02-01 RX ADMIN — CALCITRIOL 0.25 MCG: 0.25 CAPSULE, LIQUID FILLED ORAL at 19:47

## 2025-02-01 RX ADMIN — CALCIUM 500 MG: 500 TABLET ORAL at 20:37

## 2025-02-01 RX ADMIN — ROSUVASTATIN CALCIUM 5 MG: 5 TABLET, FILM COATED ORAL at 18:21

## 2025-02-01 RX ADMIN — DAPAGLIFLOZIN 10 MG: 10 TABLET, FILM COATED ORAL at 08:54

## 2025-02-01 RX ADMIN — ENOXAPARIN SODIUM 40 MG: 40 INJECTION SUBCUTANEOUS at 18:21

## 2025-02-01 RX ADMIN — METOPROLOL SUCCINATE 12.5 MG: 25 TABLET, EXTENDED RELEASE ORAL at 08:54

## 2025-02-01 RX ADMIN — ASPIRIN 81 MG: 81 TABLET, CHEWABLE ORAL at 08:54

## 2025-02-01 RX ADMIN — LEVOTHYROXINE SODIUM 100 MCG: 0.1 TABLET ORAL at 06:04

## 2025-02-01 RX ADMIN — Medication 25 MCG: at 08:54

## 2025-02-01 RX ADMIN — Medication 0.4 MG: at 18:21

## 2025-02-01 NOTE — W.PN.HOSP.TC
"Today's Communication/Plan"~"-"~"-: "~"Monitor vital signs see plan"~"Continue with metoprolol, Farxiga, Crestor"~"Continue Synthroid"~"Episodes of NSVT, continue to monitor.  Need LifeVest on Monday"~"Cardiology following"~"Assessment / Plan"~"Assessment / Plan"~"-: "~"General: Comfortable and Conversant"~"HEENT: Anicteric and Moist mucous membranes"~"Respiratory: No wheezing and Non Labored Respirations"~"Cardiac: S1/S2 and Regular Rhythm"~"GI: Soft and Non Tender"~"Musculoskeletal:  No Edema"~"Neuro: Awake, Alert, Oriented and Nonfocal/grossly intact"~"Psych: Calm"~"Newly-Diagnosed Cardiomyopathy"~"Acute Heart Failure with Reduced EF"~"-Consult Cardiology"~"-Echo Jan 2025: Left ventricle is severely dilated (7.2 cm). Severe global hypokinesis with septal dyskinesis. Estimated left ventricular ejection fraction is 10-15%."~"-Patient received Lasix 20mg IV in ED -on cardiac catheterization did not appear to be volume overloaded so no need for diuresis."~"Cardiac cath 1/31 without any CAD however does have low cardiac index."~"Now on telemetry with 33 beat run of NSVT.  Cardiology recommending LifeVest prior to discharge which should possibly be done on Monday"~"Continue aspirin, farxiga added.  "~"Metoprolol, Crestor"~"POTS"~"-dc dextroamphetamine per cards"~"Post-Surgical Hypothyroidism"~"-Continue levothyroxine"~"Hx Left Nephrectomy 2/2 congenital renal disease"~"DVT proph: Lovenox"~"Code Status: Full Code"~"Anticipated Discharge: 24 - 48 hours"~"Subjective/Interval History"~"-"~"-: "~"Date of Service:  February 1, 2025"~"Denies pain"~"Objective Data"~"-"~"Labs: "~"Laboratory Results"~" 	02/01/25"~" 	03:47"~"WBC	 9.9"~"Hgb	 14.5"~"Hct	 42.2"~"Plt Count	 222"~"Sodium	 138"~"Potassium	 4.0"~"Chloride	 106"~"Carbon Dioxide	 22"~"BUN	 21 H"~"Creatinine	 0.9"~"Glucose	 93"~"Calcium	 9.6"~"Vital Signs: "~"Vital Signs"~"Temp	Pulse	Resp	BP	Pulse Ox"~" 98.0 F 	 80 	 16 	 81/47 	 98 "~" 02/01/25 11:37	 02/01/25 12:17	 02/01/25 11:37	 02/01/25 12:03	 02/01/25 11:37"~"I&O"~"	01/31/25	02/01/25	02/02/25"~"	06:59	06:59	06:59"~"Intake Total	240 / 240	702 / 702	"~"Balance	240 / 240	702 / 702	"

## 2025-02-01 NOTE — W.PN.CD
"Today's Communication / Plan"~"-"~"-: "~"	-Normal coronary arteries yesterday; PCWP 15, CI depressed at 1.45."~"	-Most likely secondary to chronic amphetamine use (Dexedrine) for orthostasic hypotension (patient has been on it for 30 years)."~"	-DO NOT RESUME DEXEDRINE."~"	-Patient had a 33-beat run of NSVT on telemetry; recommend LifeVest prior to discharge."~"	-Reevaluate LVEF in 3 months to determine whether patient needs an ICD for primary prevention (if LVEF remains less than 35%)."~"	-Continue Toprol-XL 12.5 mg daily (unable to increase further due to blood pressure limitations)"~"	-Continue Farxiga."~"	-Further GDMT limited by blood pressure."~"	-No need for diuresis as she was not volume overloaded on right heart catheterization (PCWP 15)."~"	-Will start Crestor 5 mg daily."~"Impression / Plan"~"-"~"-: "~"Impression/Plan:  69-year-old female (recently evaluated by Dr. Rios) with chronic hypotension (on chronic Dexedrine at home for years), congenital renal disease status-post left nephrectomy, hypothyroidism, obesity, and recently discovered "~"left bundle branch block who presented for an outpatient echocardiogram which revealed an LVEF of 10-15% with associated chest tightness/dyspnea, prompting hospitalization."~"#Acute HFrEF/DCM (EF 10-15%):"~"	-New diagnosis."~"	-Dilated LV with severely reduced LVEF (10-15%) on echocardiogram; mild to moderate mitral regurgitation."~"	-Normal coronary arteries yesterday; PCWP 15, CI depressed at 1.45."~"	-Most likely secondary to chronic amphetamine use (Dexedrine) for orthostasic hypotension (patient has been on it for 30 years)."~"	-DO NOT RESUME DEXEDRINE."~"	-Patient had a 33-beat run of NSVT on telemetry; recommend LifeVest prior to discharge."~"	-Reevaluate LVEF in 3 months to determine whether patient needs an ICD for primary prevention (if LVEF remains less than 35%)."~"	-Continue Toprol-XL 12.5 mg daily (unable to increase further due to blood pressure limitations)"~"	-Continue Farxiga."~"	-Further GDMT limited by blood pressure."~"	-No need for diuresis as she was not volume overloaded on right heart catheterization (PCWP 15)."~"	-Blood pressure appears to be relatively stable at this time."~"#LBBB"~"	-New diagnosis."~"	-If CMO does not recover with GDMT in 3 months, she would benefit from CRT-D."~"#Hypotension"~"	-Chronic, previously treated with dextroamphetamine--which is the likely cause of her cardiomyopathy."~"	-Possible role for droxidopa (typically only for orthostatic hypotension/autonomic hypotension); patient has an allergy to midodrine, and fludrocortisone should be avoided given her CHF."~"#Hypothyroidism"~"	-Chronic, elevated TSH."~"	-Levothyroxine adjustment per primary team.  Does not seem high enough to be a source of her CMO."~"#HLD"~"	-New diagnosis."~"	-Total cholesterol = 207, LDL = 111, HDL = 77, Triglycerides = 95."~"	-Will start Crestor 5 mg daily."~"#Obesity"~"	-Weight loss recommended."~"Subjective/Interval History:"~"No cardiac complaints this a.m.  Had a 33-beat run of NSVT on telemetry."~"DATA:"~"Transthoracic Echocardiogram, 1/29/2025:"~"CONCLUSIONS"~" -Left ventricle is severely dilated (7.2 cm). Severe global hypokinesis with "~" septal dyskinesis. Estimated left ventricular ejection fraction is 10-15%."~" -Normal right ventricular size and function."~" -Moderately dilated left atrium."~" -Mild to moderate mitral regurgitation."~" -Trace tricuspid regurgitation. Estimated pulmonary artery pressure of 20-25 "~" mmHg."~" "~" No prior study available for comparison."~"Physical Exam"~"Vital Signs/Labs"~"-: "~"Vital Signs"~"Temp	Pulse	Resp	BP	Pulse Ox"~" 97.9 F 	 71 	 16 	 95/48 	 95 "~" 02/01/25 08:53	 02/01/25 09:00	 02/01/25 08:53	 02/01/25 08:54	 02/01/25 08:53"~"	01/31/25	02/01/25	02/02/25"~"	06:59	06:59	06:59"~"Actual Weight	76.3 kg	76.2 kg	"~"02/01/25 03:47 "~"02/01/25 03:47 "~"PT	 12.9 Sec (11.4-14.6)  	01/29/25  18:32    "~"INR	 0.93  	01/29/25  18:32    "~"Magnesium	 1.8 mg/dl (1.6-2.3)  	01/30/25  04:17    "~"Triglycerides	 95 mg/dl ()  	01/30/25  04:17    "~"LDL Cholesterol, Calc	 111 mg/dl 	01/30/25  04:17    "~"VLDL Cholesterol, Calc	 19 mg/dl (0-30)  	01/30/25  04:17    "~"HDL Cholesterol	 77 mg/dl 	01/30/25  04:17    "~"TSH	 5.21 uIU/ml (0.47-4.68)  H 	01/30/25  04:17    "~" 	01/29/25	01/29/25"~" 	12:05	15:16"~"Pro-B-Natriuretic Pept	 6830	 Cancelled"~"LAB Results"~" 	01/29/25	01/29/25"~" 	12:05	18:32"~"Troponin I	 &lt; 0.012	 &lt; 0.012"~"Physical Exam"~"Constitutional: No acute distress and Comfortable"~"EENT: Anicteric"~"Cardiovascular: Rhythm & rate is regular, Systolic murmur absent, Pedal edema present (trace) and S1S2 is normal"~"Respiratory: Respiratory effort normal and Lungs clear to auscul."~"GI: Soft"~"Neuro/Psych: AO x 3"~"Other: Skin (Warm, dry, intact)"~"Data Reviewed"~"-"~"-: "~"Date of Service:  February 1, 2025"~"EKG: Tracing Personally Visualized and interpreted (Telemetry: Sinus rhythm)"~"Labs: Labs Reviewed by me"

## 2025-02-01 NOTE — PTCARENOTE
"Patient received at change of shift resting in the bed. Right radial and brachial puncture sites with gauze and tegaderm C/D/I, area soft to palpation. Patient denies pain at this time. Denies feeling lightheaded or dizzy at this time. Offers no "~"complaints. Sinus rhythm on telemetry monitor with BBB. Oxygen saturation 96% on room air. Plan of care discussed with patient. Call bell within reach. Care ongoing"

## 2025-02-01 NOTE — PTCARENOTE
Rec'd Pt sleeping, awakens easily, offers no complaints. Lungs clear. SR 70's, Abd soft +BS, voiding in BR, no LE edema noted.

## 2025-02-02 VITALS — SYSTOLIC BLOOD PRESSURE: 92 MMHG | DIASTOLIC BLOOD PRESSURE: 59 MMHG

## 2025-02-02 VITALS — DIASTOLIC BLOOD PRESSURE: 51 MMHG | SYSTOLIC BLOOD PRESSURE: 105 MMHG

## 2025-02-02 VITALS — DIASTOLIC BLOOD PRESSURE: 48 MMHG | SYSTOLIC BLOOD PRESSURE: 91 MMHG

## 2025-02-02 VITALS — SYSTOLIC BLOOD PRESSURE: 101 MMHG | DIASTOLIC BLOOD PRESSURE: 52 MMHG | RESPIRATION RATE: 14 BRPM

## 2025-02-02 VITALS — RESPIRATION RATE: 20 BRPM

## 2025-02-02 VITALS — DIASTOLIC BLOOD PRESSURE: 48 MMHG | SYSTOLIC BLOOD PRESSURE: 97 MMHG

## 2025-02-02 VITALS — SYSTOLIC BLOOD PRESSURE: 90 MMHG | DIASTOLIC BLOOD PRESSURE: 51 MMHG | RESPIRATION RATE: 20 BRPM

## 2025-02-02 VITALS — RESPIRATION RATE: 18 BRPM

## 2025-02-02 RX ADMIN — CALCITRIOL 0.25 MCG: 0.25 CAPSULE, LIQUID FILLED ORAL at 20:26

## 2025-02-02 RX ADMIN — METOPROLOL SUCCINATE 12.5 MG: 25 TABLET, EXTENDED RELEASE ORAL at 08:18

## 2025-02-02 RX ADMIN — ROSUVASTATIN CALCIUM 5 MG: 5 TABLET, FILM COATED ORAL at 18:05

## 2025-02-02 RX ADMIN — DAPAGLIFLOZIN 10 MG: 10 TABLET, FILM COATED ORAL at 08:18

## 2025-02-02 RX ADMIN — Medication 0.4 MG: at 18:05

## 2025-02-02 RX ADMIN — Medication 25 MCG: at 08:18

## 2025-02-02 RX ADMIN — ASPIRIN 81 MG: 81 TABLET, CHEWABLE ORAL at 08:18

## 2025-02-02 RX ADMIN — ENOXAPARIN SODIUM 40 MG: 40 INJECTION SUBCUTANEOUS at 18:05

## 2025-02-02 RX ADMIN — LEVOTHYROXINE SODIUM 100 MCG: 0.1 TABLET ORAL at 05:51

## 2025-02-02 NOTE — W.PN.CD
"Today's Communication / Plan"~"-"~"-: "~"	-DO NOT RESUME DEXEDRINE."~"	-Patient had a 33-beat run of NSVT on telemetry over the weekend; recommend LifeVest prior to discharge--will be arranged Monday."~"	-Reevaluate LVEF in 3 months to determine whether patient needs an ICD for primary prevention (if LVEF remains less than 35%)."~"	-Continue Toprol-XL 12.5 mg daily (unable to increase further due to blood pressure limitations)."~"Impression / Plan"~"-"~"-: "~"Impression/Plan:  69-year-old female (recently evaluated by Dr. Rios) with chronic hypotension (on chronic Dexedrine at home for years), congenital renal disease status-post left nephrectomy, hypothyroidism, obesity, and recently discovered "~"left bundle branch block who presented for an outpatient echocardiogram which revealed an LVEF of 10-15% with associated chest tightness/dyspnea, prompting hospitalization."~"#Acute HFrEF/DCM (EF 10-15%):"~"	-New diagnosis."~"	-Dilated LV with severely reduced LVEF (10-15%) on echocardiogram; mild to moderate mitral regurgitation."~"	-Normal coronary arteries; PCWP 15, CI depressed at 1.45."~"	-Most likely secondary to chronic amphetamine use (Dexedrine) for orthostasic hypotension (patient has been on it for 30 years)."~"	-DO NOT RESUME DEXEDRINE."~"	-Patient had a 33-beat run of NSVT on telemetry over the weekend; recommend LifeVest prior to discharge--will be arranged Monday."~"	-Reevaluate LVEF in 3 months to determine whether patient needs an ICD for primary prevention (if LVEF remains less than 35%)."~"	-Continue Toprol-XL 12.5 mg daily (unable to increase further due to blood pressure limitations)."~"	-Continue Farxiga."~"	-Further GDMT limited by blood pressure."~"	-No need for diuresis as she was not volume overloaded on right heart catheterization (PCWP 15)."~"	-Blood pressure appears to be relatively stable at this time."~"#LBBB"~"	-New diagnosis."~"	-If CMO does not recover with GDMT in 3 months, she would benefit from CRT-D."~"#Hypotension"~"	-Chronic, previously treated with dextroamphetamine--which is the likely cause of her cardiomyopathy."~"	-Possible role for droxidopa (typically only for orthostatic hypotension/autonomic hypotension); patient has an allergy to midodrine, and fludrocortisone should be avoided given her CHF."~"	-Blood pressures relatively stable."~"#Hypothyroidism"~"	-Chronic, mildly elevated TSH."~"	-Levothyroxine adjustment per primary team.  Does not seem high enough to be a source of her CMO."~"#HLD"~"	-New diagnosis."~"	-Total cholesterol = 207, LDL = 111, HDL = 77, Triglycerides = 95."~"	-Started on Crestor 5 mg daily yesterday."~"#Obesity"~"	-Weight loss recommended."~"Subjective/Interval History:"~"Feels well from a cardiac standpoint.  Telemetry relatively stable; 4-beat runs of NSVT on telemetry overnight."~"DATA:"~"Transthoracic Echocardiogram, 1/29/2025:"~"CONCLUSIONS"~" -Left ventricle is severely dilated (7.2 cm). Severe global hypokinesis with "~" septal dyskinesis. Estimated left ventricular ejection fraction is 10-15%."~" -Normal right ventricular size and function."~" -Moderately dilated left atrium."~" -Mild to moderate mitral regurgitation."~" -Trace tricuspid regurgitation. Estimated pulmonary artery pressure of 20-25 "~" mmHg."~" "~" No prior study available for comparison."~"Physical Exam"~"Vital Signs/Labs"~"-: "~"Vital Signs"~"Temp	Pulse	Resp	BP	Pulse Ox"~" 98.3 F 	 74 	 20 	 105/51 	 98 "~" 02/02/25 11:26	 02/02/25 10:00	 02/02/25 11:26	 02/02/25 07:21	 02/02/25 11:26"~"	02/01/25	02/02/25	02/03/25"~"	06:59	06:59	06:59"~"Actual Weight	76.2 kg	76.3 kg	"~"02/01/25 03:47 "~"02/01/25 03:47 "~"PT	 12.9 Sec (11.4-14.6)  	01/29/25  18:32    "~"INR	 0.93  	01/29/25  18:32    "~"Magnesium	 1.8 mg/dl (1.6-2.3)  	01/30/25  04:17    "~"Triglycerides	 95 mg/dl ()  	01/30/25  04:17    "~"LDL Cholesterol, Calc	 111 mg/dl 	01/30/25  04:17    "~"VLDL Cholesterol, Calc	 19 mg/dl (0-30)  	01/30/25  04:17    "~"HDL Cholesterol	 77 mg/dl 	01/30/25  04:17    "~"TSH	 5.21 uIU/ml (0.47-4.68)  H 	01/30/25  04:17    "~" 	01/29/25	01/29/25"~" 	12:05	15:16"~"Pro-B-Natriuretic Pept	 6830	 Cancelled"~"Physical Exam"~"Constitutional: No acute distress and Comfortable"~"EENT: Anicteric"~"Cardiovascular: Rhythm & rate is regular, Pedal edema is absent, Systolic murmur absent and S1S2 is normal"~"Respiratory: Respiratory effort normal and Lungs clear to auscul."~"GI: Soft"~"Neuro/Psych: AO x 3"~"Other: Skin (Warm, dry, intact)"~"Data Reviewed"~"-"~"-: "~"Date of Service:  February 2, 2025"~"EKG: Tracing Personally Visualized and interpreted (Telemetry: Sinus rhythm; 4-beat runs of NSVT)"~"Echo: Report Reviewed by me (EF 15%)"~"Labs: Labs Reviewed by me"

## 2025-02-02 NOTE — W.PN.HOSP.TC
"Today's Communication/Plan"~"-"~"-: "~"Monitor vital signs see plan"~"Continue with Toprol, Farxiga, Crestor"~"Needs LifeVest prior to discharge"~"monitor on tele"~"Assessment / Plan"~"Assessment / Plan"~"-: "~"General: Comfortable and Conversant"~"HEENT: Anicteric and Moist mucous membranes"~"Respiratory: No wheezing and Non Labored Respirations"~"Cardiac: S1/S2 and Regular Rhythm"~"GI: Soft and Non Tender"~"Musculoskeletal:  No Edema"~"Neuro: Awake, Alert, Oriented and Nonfocal/grossly intact"~"Psych: Calm"~"Newly-Diagnosed Cardiomyopathy"~"Acute Heart Failure with Reduced EF"~"Cardiology following"~"-Echo Jan 2025: Left ventricle is severely dilated (7.2 cm). Severe global hypokinesis with septal dyskinesis. Estimated left ventricular ejection fraction is 10-15%."~"-Patient received Lasix 20mg IV in ED -on cardiac catheterization did not appear to be volume overloaded so no need for diuresis."~"Cardiac cath 1/31 without any CAD however does have low cardiac index."~"Now on telemetry with 33 beat run of NSVT.  Cardiology recommending LifeVest prior to discharge which should possibly be done on Monday"~"Continue aspirin, farxiga added.  "~"Metoprolol, Crestor"~"POTS"~"-dc dextroamphetamine per cards"~"Post-Surgical Hypothyroidism"~"-Continue levothyroxine"~"Hx Left Nephrectomy 2/2 congenital renal disease"~"DVT proph: Lovenox"~"Code Status: Full Code"~"Anticipated Discharge: Within 24 hours"~"Subjective/Interval History"~"-"~"-: "~"Date of Service:  February 2, 2025"~"Denies chest pain"~"Objective Data"~"-"~"Vital Signs: "~"Vital Signs"~"Temp	Pulse	Resp	BP	Pulse Ox"~" 98.3 F 	 74 	 20 	 105/51 	 98 "~" 02/02/25 11:26	 02/02/25 10:00	 02/02/25 11:26	 02/02/25 07:21	 02/02/25 11:26"~"I&O"~"	02/01/25	02/02/25	02/03/25"~"	06:59	06:59	06:59"~"Intake Total	702 / 702	240 / 240	"~"Balance	702 / 702	240 / 240	"

## 2025-02-03 VITALS — SYSTOLIC BLOOD PRESSURE: 106 MMHG | DIASTOLIC BLOOD PRESSURE: 58 MMHG

## 2025-02-03 VITALS — RESPIRATION RATE: 16 BRPM

## 2025-02-03 VITALS — DIASTOLIC BLOOD PRESSURE: 49 MMHG | SYSTOLIC BLOOD PRESSURE: 95 MMHG

## 2025-02-03 VITALS — DIASTOLIC BLOOD PRESSURE: 45 MMHG | SYSTOLIC BLOOD PRESSURE: 107 MMHG

## 2025-02-03 VITALS — SYSTOLIC BLOOD PRESSURE: 116 MMHG | DIASTOLIC BLOOD PRESSURE: 55 MMHG

## 2025-02-03 VITALS — RESPIRATION RATE: 18 BRPM

## 2025-02-03 LAB
BUN SERPL-MCNC: 21 MG/DL (ref 7–17)
CALCIUM SERPL-MCNC: 9 MG/DL (ref 8.4–10.2)
CHLORIDE SERPL-SCNC: 107 MMOL/L (ref 98–107)
CO2 SERPL-SCNC: 22 MMOL/L (ref 22–30)
EGFR: > 60
ERYTHROCYTE [DISTWIDTH] IN BLOOD BY AUTOMATED COUNT: 13.3 % (ref 11.5–14.5)
ESTIMATED CREATININE CLEARANCE: 57 ML/MIN
GLUCOSE SERPL-MCNC: 97 MG/DL (ref 70–99)
HCT VFR BLD AUTO: 43.4 % (ref 37–47)
HGB BLD-MCNC: 14.8 G/DL (ref 12–16)
MCHC RBC AUTO-ENTMCNC: 34.1 G/DL (ref 33–37)
MCV RBC AUTO: 86.6 FL (ref 81–99)
PLATELET # BLD AUTO: 225 10^3/UL (ref 130–400)
POTASSIUM SERPL-SCNC: 4.2 MMOL/L (ref 3.5–5.1)
SODIUM SERPL-SCNC: 141 MMOL/L (ref 135–145)

## 2025-02-03 RX ADMIN — Medication 25 MCG: at 08:36

## 2025-02-03 RX ADMIN — METOPROLOL SUCCINATE: 25 TABLET, EXTENDED RELEASE ORAL at 08:24

## 2025-02-03 RX ADMIN — Medication 1 FLUSH: at 08:36

## 2025-02-03 RX ADMIN — ASPIRIN 81 MG: 81 TABLET, CHEWABLE ORAL at 08:36

## 2025-02-03 RX ADMIN — METOPROLOL SUCCINATE 25 MG: 25 TABLET, FILM COATED, EXTENDED RELEASE ORAL at 08:36

## 2025-02-03 RX ADMIN — LEVOTHYROXINE SODIUM 100 MCG: 0.1 TABLET ORAL at 06:07

## 2025-02-03 RX ADMIN — DAPAGLIFLOZIN 10 MG: 10 TABLET, FILM COATED ORAL at 08:36

## 2025-02-03 NOTE — W.DCSUMMARY
"Discharge Summary"~"Discharge Data"~"Date of Admission: 01/29/25"~"Date of Discharge: 02/03/25"~"-"~"Pending Results: No"~"Hospital Course"~"-: "~"70yo F with PMHX of orthostaic hypotension on chronic dextroamphetamine, hypothyroidism, Hx of L nephrectomy found EF of 10-15% on outpatient echo, sent to ED for management of acute CHF due to chest tightness and dyspnea, Dexedrine was stopped, R "~"heart cath showed no significant signs of volume overload. Started on GDMT and lifevest placed due to episodes of NSVT on telemetry. Medically stabel for d/c home"~"I have spent at least 36min reviewing chart, test reuslts, communication with consultants and direct patient care"~"Patient was managed for:"~"#LBBB"~"#Acute HFrEF"~"#Essential HTN"~"#Orthostatic hypotension/POTS"~"#Hypothyroidism"~"#HLD"~"Discharge Plan"~"-"~"Patient Disposition: Home (Routine Discharge)"~"Discharge Diagnosis/Procedures: Dilated cardiomyopathy "~"NSVT "~"Orthostatic hypotension "~"Procedure: Cardiac catheterization 1/31/2025 "~"Diet: 2 Gram Sodium and Restrict fluids to 48 oz"~"Activity: No strenuous activity"~"Additional Activity: See attached instructions "~"Driving Restrictions: No driving for 24 hours"~"Bathing Restrictions: OK to Shower"~"Specialty Instructions: Weigh Daily- Call MD for wt gain/loss 3 lbs overnight/5 lbs in 1 week"~"Instructions:  *CBC Heart Failure Instructions"~"Stand Alone Forms:  DC Instructions- Cath/EP Lab"~"Referrals:"~"Ema Hammond CRNP [Specified Professional Personl] - 02/21/25 1:40 pm (Cardiology followup appointment)"~"Crow Spencer MD [Family Provider] - "~"Additional Discharge Medication Instructions: Stop Dexedrine.  Do not resume. "~"Prescriptions:"~"New"~"  dapagliflozin propanediol 10 mg Tablet "~"   10 mg PO DAILY Qty: 30 5RF"~"  metoprolol succinate 25 mg Tablet Extended Release 24 Hr "~"   25 mg PO DAILY Qty: 30 0RF"~"  rosuvastatin 5 mg Tablet "~"   5 mg PO QPM Qty: 30 0RF"~"Continued"~"  levothyroxine [Synthroid] 100 mcg Tablet "~"   100 mcg PO DAILY "~"  cyanocobalamin (vitamin B-12) 1,000 mcg Tablet "~"   1,000 mcg PO MOWEFR@2000 "~"  therapeutic multivitamin  Tablet "~"   1 tab PO QPM "~"  folic acid 400 mcg Tablet "~"   0.4 mg PO QPM "~"  aspirin 81 mg Tablet,Delayed Release (Dr/Ec) "~"   81 mg PO DAILY "~"  calcium carbonate 500 mg calcium (1,250 mg) Tablet "~"   500 mg PO QPM "~"  calcitriol 0.25 mcg Capsule "~"   0.25 mcg PO SUTUTHSA@2000 "~"  cholecalciferol (vitamin D3) [Vitamin D3] 25 mcg (1,000 unit) Tablet "~"   25 mcg PO DAILY "~"  turmeric 400 mg Capsule "~"   400 mg PO QPM "~"Discontinued"~"  dextroamphetamine sulfate 15 mg capsule, extended release "~"   15 mg PO DAILY "~"Care Plan Goals"~"Care Plan Goals:"~"Problem: Readiness for enhanced knowledge related to diagnosis and treatment plan"~"Goal: Understand your diagnosis and treatment plan needs, including medications if applicable."~"Instructions: Know your diagnosis, underlying causes and treatment plan options, including medications if applicable. Consult with your health care team to learn about your diagnosis and treatment plan, including medications if applicable."~"Discharge Date and Time"~"Print Language: ENGLISH"

## 2025-02-03 NOTE — PTCARENOTE
Patient instructed on life vest and appliance in place. Reviewed discharge instructions, new medications and follow up appointment with the patient and she states her understanding. Patient discharged home with her  with the life vest on.

## 2025-02-03 NOTE — W.PN.CD
"Today's Communication / Plan"~"-"~"-: "~"Metop XL 25 mg "~"Farxiga 10 mg"~"LifeVest today and then likely d/c"~"D/c likely today, we are arranging follow up. "~"Impression / Plan"~"-"~"-: "~"Impression/Plan:  69-year-old female (recently evaluated by Dr. Rios) with chronic hypotension (on chronic Dexedrine at home for years), congenital renal disease status-post left nephrectomy, hypothyroidism, obesity, and recently discovered "~"left bundle branch block who presented for an outpatient echocardiogram which revealed an LVEF of 10-15% with associated chest tightness/dyspnea, prompting hospitalization."~"#Acute HFrEF/DCM (EF 10-15%):"~"	-New diagnosis."~"	-Dilated LV with severely reduced LVEF (10-15%) on echocardiogram; mild to moderate mitral regurgitation."~"	-Normal coronary arteries; PCWP 15, CI depressed at 1.45."~"	-Most likely secondary to chronic amphetamine use (Dexedrine) for orthostasic hypotension (patient has been on it for 30 years)."~"	-DO NOT RESUME DEXEDRINE."~"	-Patient had a 33-beat run of NSVT on telemetry over the weekend; recommend LifeVest prior to discharge--will be arranged Monday."~"	-Reevaluate LVEF in 3 months to determine whether patient needs an ICD for primary prevention (if LVEF remains less than 35%)."~"	-Continue Toprol-XL 25 mg daily (unable to increase further due to blood pressure limitations)."~"	-Continue Farxiga."~"	-Further GDMT limited by blood pressure."~"	-No need for diuresis as she was not volume overloaded on right heart catheterization (PCWP 15)."~"	-Blood pressure appears to be relatively stable at this time."~"#LBBB"~"	-New diagnosis."~"	-If CMO does not recover with GDMT in 3 months, she would benefit from CRT-D."~"#Hypotension"~"	-Chronic, previously treated with dextroamphetamine--which is the likely cause of her cardiomyopathy."~"	-Possible role for droxidopa (typically only for orthostatic hypotension/autonomic hypotension); patient has an allergy to midodrine, and fludrocortisone should be avoided given her CHF."~"	-Blood pressures relatively stable."~"#Hypothyroidism"~"	-Chronic, mildly elevated TSH."~"	-Levothyroxine adjustment per primary team.  Does not seem high enough to be a source of her CMO."~"#HLD"~"	-New diagnosis."~"	-Total cholesterol = 207, LDL = 111, HDL = 77, Triglycerides = 95."~"	-Started on Crestor 5 mg daily yesterday."~"#Obesity"~"	-Weight loss recommended."~"Subjective/Interval History:"~"Feels well from a cardiac standpoint essentially stil asymptomatic"~"LifeVest pending"~"DATA:"~"Transthoracic Echocardiogram, 1/29/2025:"~"CONCLUSIONS"~" -Left ventricle is severely dilated (7.2 cm). Severe global hypokinesis with "~" septal dyskinesis. Estimated left ventricular ejection fraction is 10-15%."~" -Normal right ventricular size and function."~" -Moderately dilated left atrium."~" -Mild to moderate mitral regurgitation."~" -Trace tricuspid regurgitation. Estimated pulmonary artery pressure of 20-25 "~" mmHg."~" "~" No prior study available for comparison."~"Physical Exam"~"Vital Signs/Labs"~"-: "~"Vital Signs"~"Temp	Pulse	Resp	BP	Pulse Ox"~" 97.8 F 	 75 	 16 	 107/45 	 98 "~" 02/03/25 07:26	 02/03/25 07:28	 02/03/25 07:26	 02/03/25 07:28	 02/03/25 07:26"~"	02/02/25	02/03/25	02/04/25"~"	06:59	06:59	06:59"~"Actual Weight	168 lb 3.403 oz	166 lb 14.239 oz	"~"02/03/25 03:13 "~"02/03/25 03:13 "~"PT	 12.9 Sec (11.4-14.6)  	01/29/25  18:32    "~"INR	 0.93  	01/29/25  18:32    "~"Magnesium	 1.8 mg/dl (1.6-2.3)  	01/30/25  04:17    "~"Triglycerides	 95 mg/dl ()  	01/30/25  04:17    "~"LDL Cholesterol, Calc	 111 mg/dl 	01/30/25  04:17    "~"VLDL Cholesterol, Calc	 19 mg/dl (0-30)  	01/30/25  04:17    "~"HDL Cholesterol	 77 mg/dl 	01/30/25  04:17    "~"TSH	 5.21 uIU/ml (0.47-4.68)  H 	01/30/25  04:17    "~" 	01/29/25	01/29/25"~" 	12:05	15:16"~"Pro-B-Natriuretic Pept	 6830	 Cancelled"~"Physical Exam"~"Constitutional: No acute distress"~"EENT: Anicteric"~"Cardiovascular: Rhythm & rate is regular (distant heart sounds) and Pedal edema is absent"~"Respiratory: Respiratory effort normal and Lungs clear to auscul."~"GI: Soft"~"Neuro/Psych: AO x 3"~"Data Reviewed"~"-"~"-: "~"Date of Service:  February 3, 2025"~"Medical Decision Making: Reviewed Test Results"~"EKG: Tracing Personally Visualized and interpreted (sr agree)"~"Echo: Tracing Personally Visualized and interpreted"~"Labs: Labs Reviewed by me"

## 2025-02-03 NOTE — PTCARENOTE
Received patient this morning resting in bed, seen by cardiology, medications adjusted. Waiting for life vest today and planning for discharge after.

## 2025-02-03 NOTE — W.PN.HOSP.TC
"Today's Communication/Plan"~"-"~"-: "~"lifevest and d/c"~"Assessment / Plan"~"Assessment / Plan"~"-: "~"68yo F with PMHX of orthostaic hypotension on chronic dextroamphetamine, hypothyroidism, Hx of L nephrectomy found EF of 10-15% on outpatient echo, sent to ED for management of acute CHF due to chest tightness and dyspnea, Dexedrine was stopped, R "~"heart cath showed no significant signs of volume overload. Started on GDMT and planned for lifevest due to episodes of NSVT on telemetry. "~"A/P"~"#LBBB"~"#Acute HFrEF"~"#Essential HTN"~"#Orthostatic hypotension/POTS"~"most likely 2/2 Dexedrine - stopped"~"GMDT as per cardiology"~"added Farxiga"~"#Hypothyroidism"~"Minimally elevated TSH 5.21  - repeat in 2-3 weeks with PCP"~"cont synthroid"~"#HLD"~"Cont statin"~"DVT ppx lovenox"~"Full code"~"I have spent at least 36min reviewing chart, test reuslts, communication with consultants and direct patient care"~"Anticipated Discharge: Within 24 hours"~"Subjective/Interval History"~"-"~"-: "~"Date of Service:  February 3, 2025"~"Objective Data"~"-"~"Labs: "~"Laboratory Results"~" 	02/03/25"~" 	03:13"~"WBC	 10.0"~"Hgb	 14.8"~"Hct	 43.4"~"Plt Count	 225"~"Sodium	 141"~"Potassium	 4.2"~"Chloride	 107"~"Carbon Dioxide	 22"~"BUN	 21 H"~"Creatinine	 0.9"~"Glucose	 97"~"Calcium	 9.0"~"Vital Signs: "~"Vital Signs"~"Temp	Pulse	Resp	BP	Pulse Ox"~" 97.8 F 	 75 	 16 	 107/45 	 98 "~" 02/03/25 07:26	 02/03/25 07:28	 02/03/25 07:26	 02/03/25 07:28	 02/03/25 07:26"~"I&O"~"	02/02/25	02/03/25	02/04/25"~"	06:59	06:59	06:59"~"Intake Total	240 / 240		"~"Balance	240 / 240		"~"Review of Systems"~"-"~"History Source: Patient"~"All other systems: Reviewed and negative"~"Physical Exam"~"-"~"General: No Apparent Distress"~"HEENT: Normocephalic"~"Respiratory: Clear to Auscultation"~"Cardiac: Regular Rhythm"~"Neuro: Awake, Alert, Oriented and AO x 3"~"Psych: Calm"

## 2025-02-03 NOTE — CM
"Reviewed chart. Received message thet Mrs. Jurado will need a Life Vest.  Telephone call to [employer] Life Vest Liaison to make the referral. sent the referral.  Received call back from Life Vest Liaison who states the Life Vest was approved and [employer] "~"Vest will be in around 3:00 to 4:00 p.m. to teach her.  She states prior to admission she resides with her significant other in a two story with her  home with two steps to enter.  She states she has a first floor set-up. She states prior to "~"admission  she was independent with ambulation and adls.  She states she has walker, and wheelchair that were her mothers.  Her mother is currently  in [facility].  She states she has a prescription plan. Medical work-up in progress. "~"The discharge plan is to turn home with her significant other when medically stable. "

## 2025-02-04 NOTE — W.HF.CON
"Heart Failure"~"- LV Function"~"Left ventricular function study result: LV Ejection fraction &lt;/= 35% (ECHO 1/29/25)"~"Ejection Fraction Percentage: 10-15"~"- ARNI"~"Patient already on ARNI: No"~"Heart Failure ARNI Contraindication: Hypotension"~"- ACEI/ARB"~"Patient already on ACEI/ARB: No"~"Heart Failure ACEI/ARB Contraindication: Hypotension"~"- Beta Blocker"~"Patient already on Evidence Based Beta Blocker: Yes"~"- Mineralocorticord Receptor Antagonist"~"Patient already on MRA: No"~"Heart Failure MRA Contraindication: Hypotension"~"- SGLT-2 Inhibitor"~"Patient already on SGLT-2 Inhibitor: Yes"~"- NYHA CHF Classification"~"NYHA CHF Classification Level: Class III - Symptoms w/ min exertion, interferes w/ nml daily activity"~"- ACC/AHA Stage"~"ACC/AHA Stage: Stage C: Symptomatic Heart Failure"

## 2025-02-12 ENCOUNTER — RESULTS FOLLOW-UP (OUTPATIENT)
Dept: ENDOCRINOLOGY | Facility: HOSPITAL | Age: 70
End: 2025-02-12

## 2025-02-12 LAB
25(OH)D3+25(OH)D2 SERPL-MCNC: 92.7 NG/ML (ref 30–100)
ALBUMIN SERPL-MCNC: 4.3 G/DL (ref 3.9–4.9)
ALP SERPL-CCNC: 82 IU/L (ref 44–121)
ALT SERPL-CCNC: 7 IU/L (ref 0–32)
AST SERPL-CCNC: 14 IU/L (ref 0–40)
BILIRUB SERPL-MCNC: 0.5 MG/DL (ref 0–1.2)
BUN SERPL-MCNC: 14 MG/DL (ref 8–27)
BUN/CREAT SERPL: 15 (ref 12–28)
CALCIUM SERPL-MCNC: 9 MG/DL (ref 8.7–10.3)
CHLORIDE SERPL-SCNC: 102 MMOL/L (ref 96–106)
CO2 SERPL-SCNC: 24 MMOL/L (ref 20–29)
CREAT SERPL-MCNC: 0.94 MG/DL (ref 0.57–1)
EGFR: 66 ML/MIN/1.73
GLOBULIN SER-MCNC: 2.5 G/DL (ref 1.5–4.5)
GLUCOSE SERPL-MCNC: 85 MG/DL (ref 70–99)
PHOSPHATE SERPL-MCNC: 4 MG/DL (ref 3–4.3)
POTASSIUM SERPL-SCNC: 4.4 MMOL/L (ref 3.5–5.2)
PROT SERPL-MCNC: 6.8 G/DL (ref 6–8.5)
PTH-INTACT SERPL-MCNC: 15 PG/ML (ref 15–65)
SODIUM SERPL-SCNC: 142 MMOL/L (ref 134–144)
T4 FREE SERPL-MCNC: 1.72 NG/DL (ref 0.82–1.77)
TSH SERPL DL<=0.005 MIU/L-ACNC: 1.75 UIU/ML (ref 0.45–4.5)

## 2025-03-12 ENCOUNTER — OFFICE VISIT (OUTPATIENT)
Dept: ENDOCRINOLOGY | Facility: HOSPITAL | Age: 70
End: 2025-03-12
Payer: COMMERCIAL

## 2025-03-12 VITALS
HEART RATE: 76 BPM | WEIGHT: 172 LBS | SYSTOLIC BLOOD PRESSURE: 122 MMHG | HEIGHT: 62 IN | DIASTOLIC BLOOD PRESSURE: 84 MMHG | OXYGEN SATURATION: 97 % | BODY MASS INDEX: 31.65 KG/M2

## 2025-03-12 DIAGNOSIS — E20.9 HYPOPARATHYROIDISM, UNSPECIFIED HYPOPARATHYROIDISM TYPE (HCC): ICD-10-CM

## 2025-03-12 DIAGNOSIS — E89.0 POSTOPERATIVE HYPOTHYROIDISM: Primary | ICD-10-CM

## 2025-03-12 PROCEDURE — 99214 OFFICE O/P EST MOD 30 MIN: CPT | Performed by: NURSE PRACTITIONER

## 2025-03-12 RX ORDER — CALCITRIOL 0.25 UG/1
CAPSULE, LIQUID FILLED ORAL
Qty: 54 CAPSULE | Refills: 3 | Status: SHIPPED | OUTPATIENT
Start: 2025-03-12

## 2025-03-12 RX ORDER — ROSUVASTATIN CALCIUM 5 MG/1
1 TABLET, COATED ORAL DAILY
COMMUNITY
Start: 2025-03-05

## 2025-03-12 RX ORDER — LEVOTHYROXINE SODIUM 100 UG/1
TABLET ORAL
Qty: 90 TABLET | Refills: 3 | Status: SHIPPED | OUTPATIENT
Start: 2025-03-12

## 2025-03-12 RX ORDER — DAPAGLIFLOZIN 10 MG/1
1 TABLET, FILM COATED ORAL DAILY
COMMUNITY
Start: 2025-02-28

## 2025-03-12 RX ORDER — FUROSEMIDE 20 MG/1
TABLET ORAL AS NEEDED
COMMUNITY
Start: 2025-02-19

## 2025-03-12 RX ORDER — METOPROLOL SUCCINATE 25 MG/1
TABLET, EXTENDED RELEASE ORAL EVERY 24 HOURS
COMMUNITY
Start: 2025-03-06

## 2025-03-12 NOTE — PATIENT INSTRUCTIONS
Continue with levothyroxine 100 mcg - 6 days a week and take 1/2 tablet on Sunday.     Continue with Calcitrol 0.25 mcg - 4 days a week.     Continue with calcium daily.    Contact the office with your dose of vitamin-D.     Follow-up in 6 months with lab work completed prior to visit.

## 2025-03-12 NOTE — PROGRESS NOTES
Name: Imani Luciano      : 1955      MRN: [id number]  Encounter Provider: FALGUNI Jaeger  Encounter Date: 3/12/2025   Encounter department: [facility]    No chief complaint on file.  :  Assessment & Plan  Postoperative hypothyroidism    Orders:    levothyroxine 100 mcg tablet; TAKE ONE TABLET BY MOUTH ONCE DAILY FOR 6 DAYS OF THE WEEK AND ONE-HALF TABLET ON     Comprehensive metabolic panel; Future    T4, free; Future    TSH, 3rd generation; Future    Vitamin D 25 hydroxy; Future    PTH, intact; Future    Phosphorus; Future    Hypoparathyroidism, unspecified hypoparathyroidism type (HCC)    Plan:  1.  Postoperative hypothyroidism: TSH is normal.  Generally, she feels well.  She will continue levothyroxine 100 mcg daily Monday through Saturday with 1/2 tablet on . Titration of her levothyroxine dose may take place upon review of updated lab work.     2.  Hypoparathyroidism:  She is currently taking on calcitriol 0.25 mcg - 4 days a week and calcium 600 mg once a daily.   Her most recent calcium level is normal.  For now, she will continue Calcitriol 0.25 mcg every other day with calcium.  She will contact the office if she develops any worsening muscle cramping, numbness, tingling, perioral paresthesias or difficulty breathing.    3.  Vitamin D Deficiency:  Confirm dose of Vitamin D capsules.    History of Present Illness     70 y.o. year old female with history of hypothyroidism who presents for follow-up. She had a history of goiter and thyroid nodules reportedly that eventually underwent surgery for neck compressive symptoms at [facility] in 2017.  She then was treated for postoperative hypothyroidism with levothyroxine 100 mcg daily with a half tablet on .  Her most recent TSH from 2025 is 1.750 with a free T4 of 1.72.   Overall, she feels well but notes chronic joint aches, pains, muscle cramping at times.   She  "denies any anterior neck discomfort, dysphagia or dysphonia.  She quit smoking January 29, 2025.  Now utilizing a life vest and following with cardiology.     She is also taking calcitriol 0.25 mcg on Sunday, Tuesday, Thursday and Saturday, as well as, calcium 600 mg every evening with vitamin-D daily along with 5000 units of vitamin-D daily for postoperative hypoparathyroidism.   Her most recent calcium from February 11, 2025 is 9.0 with an albumin of 4.3 and PTH is 15.  Recent phosphorus level was 4.0.  Her vitamin-D level from February 8, 2024 is 92.7. She denies any aresthesias, perioral paresthesias or shortness of breath.     Review of Systems   Constitutional: Negative.  Negative for chills, fatigue and fever.   HENT: Negative.  Negative for trouble swallowing and voice change.    Eyes: Negative.  Negative for photophobia, pain, discharge, redness, itching and visual disturbance.   Respiratory: Negative.  Negative for chest tightness and shortness of breath.    Cardiovascular: Negative.  Negative for chest pain.   Gastrointestinal: Negative.  Negative for abdominal pain, constipation, diarrhea and vomiting.   Endocrine: Negative for cold intolerance, heat intolerance, polydipsia, polyphagia and polyuria.   Genitourinary: Negative.    Musculoskeletal: Negative.    Skin: Negative.    Allergic/Immunologic: Negative.    Neurological: Negative.  Negative for dizziness, syncope, light-headedness and headaches.   Hematological: Negative.    Psychiatric/Behavioral: Negative.     All other systems reviewed and are negative.    Objective   /84   Pulse 76   Ht 5' 2\" (1.575 m)   Wt 78 kg (172 lb)   SpO2 97%   BMI 31.46 kg/m²      Body mass index is 31.46 kg/m².  Wt Readings from Last 3 Encounters:   03/12/25 78 kg (172 lb)   08/21/24 78 kg (172 lb)   02/14/24 80.1 kg (176 lb 9.6 oz)     Physical Exam  Vitals reviewed.   Constitutional:       Appearance: She is well-developed.   HENT:      Head: Normocephalic " "and atraumatic.   Eyes:      Conjunctiva/sclera: Conjunctivae normal.      Pupils: Pupils are equal, round, and reactive to light.   Cardiovascular:      Rate and Rhythm: Normal rate and regular rhythm.      Heart sounds: Normal heart sounds.   Pulmonary:      Effort: Pulmonary effort is normal.      Breath sounds: Normal breath sounds.   Abdominal:      General: Bowel sounds are normal.      Palpations: Abdomen is soft.   Musculoskeletal:         General: Normal range of motion.      Cervical back: Normal range of motion and neck supple.   Skin:     General: Skin is warm and dry.   Neurological:      Mental Status: She is alert and oriented to person, place, and time.   Psychiatric:         Behavior: Behavior normal.         Thought Content: Thought content normal.         Judgment: Judgment normal.       Labs:   No results found for: \"HGBA1C\"  Lab Results   Component Value Date    CREATININE 0.94 02/11/2025    CREATININE 0.91 07/15/2024    CREATININE 0.83 02/08/2024    BUN 14 02/11/2025    K 4.4 02/11/2025     02/11/2025    CO2 24 02/11/2025     eGFR   Date Value Ref Range Status   02/11/2025 66 >59 mL/min/1.73 Final     No results found for: \"CHOL\", \"HDL\", \"LDL\", \"TRIG\", \"CHOLHDL\"  Lab Results   Component Value Date    ALT 7 02/11/2025    AST 14 02/11/2025     No results found for: \"QCQ3TRUDOCED\"  Lab Results   Component Value Date    FREET4 1.72 02/11/2025       There are no Patient Instructions on file for this visit.    Discussed with the patient and all questioned fully answered. She will call me if any problems arise.      "

## 2025-05-01 ENCOUNTER — HOSPITAL ENCOUNTER (OUTPATIENT)
Dept: HOSPITAL 99 - RCS | Age: 70
End: 2025-05-01
Payer: COMMERCIAL

## 2025-05-01 DIAGNOSIS — I42.8: Primary | ICD-10-CM

## 2025-05-01 PROCEDURE — 93308 TTE F-UP OR LMTD: CPT

## 2025-05-09 ENCOUNTER — HOSPITAL ENCOUNTER (OUTPATIENT)
Dept: HOSPITAL 99 - CATH | Age: 70
LOS: 1 days | Discharge: HOME | End: 2025-05-10
Payer: COMMERCIAL

## 2025-05-09 VITALS — SYSTOLIC BLOOD PRESSURE: 93 MMHG | DIASTOLIC BLOOD PRESSURE: 56 MMHG

## 2025-05-09 VITALS — DIASTOLIC BLOOD PRESSURE: 52 MMHG | SYSTOLIC BLOOD PRESSURE: 87 MMHG

## 2025-05-09 VITALS — SYSTOLIC BLOOD PRESSURE: 85 MMHG | DIASTOLIC BLOOD PRESSURE: 65 MMHG

## 2025-05-09 VITALS — DIASTOLIC BLOOD PRESSURE: 55 MMHG | SYSTOLIC BLOOD PRESSURE: 84 MMHG

## 2025-05-09 VITALS — DIASTOLIC BLOOD PRESSURE: 48 MMHG | SYSTOLIC BLOOD PRESSURE: 77 MMHG

## 2025-05-09 VITALS — SYSTOLIC BLOOD PRESSURE: 94 MMHG | DIASTOLIC BLOOD PRESSURE: 34 MMHG

## 2025-05-09 VITALS — DIASTOLIC BLOOD PRESSURE: 44 MMHG | SYSTOLIC BLOOD PRESSURE: 73 MMHG

## 2025-05-09 VITALS — SYSTOLIC BLOOD PRESSURE: 65 MMHG | DIASTOLIC BLOOD PRESSURE: 41 MMHG

## 2025-05-09 VITALS — DIASTOLIC BLOOD PRESSURE: 59 MMHG | SYSTOLIC BLOOD PRESSURE: 71 MMHG

## 2025-05-09 VITALS — DIASTOLIC BLOOD PRESSURE: 43 MMHG | SYSTOLIC BLOOD PRESSURE: 111 MMHG

## 2025-05-09 VITALS — DIASTOLIC BLOOD PRESSURE: 37 MMHG | SYSTOLIC BLOOD PRESSURE: 89 MMHG

## 2025-05-09 VITALS — DIASTOLIC BLOOD PRESSURE: 45 MMHG | SYSTOLIC BLOOD PRESSURE: 102 MMHG

## 2025-05-09 VITALS — DIASTOLIC BLOOD PRESSURE: 53 MMHG | SYSTOLIC BLOOD PRESSURE: 99 MMHG

## 2025-05-09 VITALS — SYSTOLIC BLOOD PRESSURE: 82 MMHG | DIASTOLIC BLOOD PRESSURE: 46 MMHG

## 2025-05-09 VITALS — SYSTOLIC BLOOD PRESSURE: 111 MMHG | OXYGEN SATURATION: 2 % | DIASTOLIC BLOOD PRESSURE: 43 MMHG

## 2025-05-09 VITALS — SYSTOLIC BLOOD PRESSURE: 84 MMHG | DIASTOLIC BLOOD PRESSURE: 55 MMHG

## 2025-05-09 VITALS — SYSTOLIC BLOOD PRESSURE: 89 MMHG | DIASTOLIC BLOOD PRESSURE: 51 MMHG

## 2025-05-09 VITALS — SYSTOLIC BLOOD PRESSURE: 96 MMHG | DIASTOLIC BLOOD PRESSURE: 48 MMHG

## 2025-05-09 VITALS — SYSTOLIC BLOOD PRESSURE: 82 MMHG | DIASTOLIC BLOOD PRESSURE: 52 MMHG

## 2025-05-09 VITALS — SYSTOLIC BLOOD PRESSURE: 74 MMHG | DIASTOLIC BLOOD PRESSURE: 35 MMHG

## 2025-05-09 VITALS — DIASTOLIC BLOOD PRESSURE: 52 MMHG | SYSTOLIC BLOOD PRESSURE: 89 MMHG

## 2025-05-09 VITALS — SYSTOLIC BLOOD PRESSURE: 81 MMHG | DIASTOLIC BLOOD PRESSURE: 42 MMHG

## 2025-05-09 VITALS — SYSTOLIC BLOOD PRESSURE: 99 MMHG | DIASTOLIC BLOOD PRESSURE: 49 MMHG

## 2025-05-09 VITALS — SYSTOLIC BLOOD PRESSURE: 96 MMHG | DIASTOLIC BLOOD PRESSURE: 52 MMHG

## 2025-05-09 VITALS — SYSTOLIC BLOOD PRESSURE: 74 MMHG | DIASTOLIC BLOOD PRESSURE: 41 MMHG

## 2025-05-09 VITALS — SYSTOLIC BLOOD PRESSURE: 65 MMHG | DIASTOLIC BLOOD PRESSURE: 46 MMHG

## 2025-05-09 VITALS — SYSTOLIC BLOOD PRESSURE: 89 MMHG | DIASTOLIC BLOOD PRESSURE: 50 MMHG

## 2025-05-09 VITALS — DIASTOLIC BLOOD PRESSURE: 45 MMHG | SYSTOLIC BLOOD PRESSURE: 79 MMHG

## 2025-05-09 VITALS — DIASTOLIC BLOOD PRESSURE: 40 MMHG | SYSTOLIC BLOOD PRESSURE: 88 MMHG

## 2025-05-09 VITALS — DIASTOLIC BLOOD PRESSURE: 47 MMHG | SYSTOLIC BLOOD PRESSURE: 89 MMHG

## 2025-05-09 VITALS — BODY MASS INDEX: 31.6 KG/M2

## 2025-05-09 VITALS — DIASTOLIC BLOOD PRESSURE: 104 MMHG | SYSTOLIC BLOOD PRESSURE: 119 MMHG

## 2025-05-09 VITALS — DIASTOLIC BLOOD PRESSURE: 53 MMHG

## 2025-05-09 DIAGNOSIS — I44.7: ICD-10-CM

## 2025-05-09 DIAGNOSIS — Z88.0: ICD-10-CM

## 2025-05-09 DIAGNOSIS — E66.9: ICD-10-CM

## 2025-05-09 DIAGNOSIS — E03.9: ICD-10-CM

## 2025-05-09 DIAGNOSIS — Z91.030: ICD-10-CM

## 2025-05-09 DIAGNOSIS — Z79.899: ICD-10-CM

## 2025-05-09 DIAGNOSIS — I95.9: ICD-10-CM

## 2025-05-09 DIAGNOSIS — Z79.82: ICD-10-CM

## 2025-05-09 DIAGNOSIS — I49.3: ICD-10-CM

## 2025-05-09 DIAGNOSIS — I42.0: Primary | ICD-10-CM

## 2025-05-09 DIAGNOSIS — Z90.5: ICD-10-CM

## 2025-05-09 DIAGNOSIS — E78.5: ICD-10-CM

## 2025-05-09 DIAGNOSIS — I95.89: ICD-10-CM

## 2025-05-09 DIAGNOSIS — Z79.890: ICD-10-CM

## 2025-05-09 DIAGNOSIS — I50.9: ICD-10-CM

## 2025-05-09 PROCEDURE — C1777 LEAD, AICD, ENDO SINGLE COIL: HCPCS

## 2025-05-09 PROCEDURE — C1900 LEAD, CORONARY VENOUS: HCPCS

## 2025-05-09 PROCEDURE — C1892 INTRO/SHEATH,FIXED,PEEL-AWAY: HCPCS

## 2025-05-09 PROCEDURE — C1769 GUIDE WIRE: HCPCS

## 2025-05-09 PROCEDURE — C1887 CATHETER, GUIDING: HCPCS

## 2025-05-09 PROCEDURE — C1898 LEAD, PMKR, OTHER THAN TRANS: HCPCS

## 2025-05-09 PROCEDURE — 33225 L VENTRIC PACING LEAD ADD-ON: CPT

## 2025-05-09 PROCEDURE — 33249 INSJ/RPLCMT DEFIB W/LEAD(S): CPT

## 2025-05-09 PROCEDURE — C1882 AICD, OTHER THAN SING/DUAL: HCPCS

## 2025-05-09 RX ADMIN — TRAMADOL HYDROCHLORIDE 50 MG: 50 TABLET, COATED ORAL at 20:22

## 2025-05-09 RX ADMIN — TRAMADOL HYDROCHLORIDE 50 MG: 50 TABLET, COATED ORAL at 13:16

## 2025-05-09 RX ADMIN — ROSUVASTATIN CALCIUM 5 MG: 5 TABLET, FILM COATED ORAL at 18:21

## 2025-05-09 RX ADMIN — VANCOMYCIN HYDROCHLORIDE 200: 1 INJECTION, SOLUTION INTRAVENOUS at 07:08

## 2025-05-09 RX ADMIN — AZTREONAM 2000 MG: 2 INJECTION, POWDER, LYOPHILIZED, FOR SOLUTION INTRAMUSCULAR; INTRAVENOUS at 08:11

## 2025-05-09 RX ADMIN — WATER 10 ML: 1 INJECTION INTRAMUSCULAR; INTRAVENOUS; SUBCUTANEOUS at 08:11

## 2025-05-10 VITALS — SYSTOLIC BLOOD PRESSURE: 93 MMHG | DIASTOLIC BLOOD PRESSURE: 46 MMHG

## 2025-05-10 VITALS — BODY MASS INDEX: 31.5 KG/M2

## 2025-05-10 LAB
BUN SERPL-MCNC: 20 MG/DL (ref 7–17)
CALCIUM SERPL-MCNC: 8.4 MG/DL (ref 8.4–10.2)
CHLORIDE SERPL-SCNC: 107 MMOL/L (ref 98–107)
CO2 SERPL-SCNC: 25 MMOL/L (ref 22–30)
EGFR: > 60
ERYTHROCYTE [DISTWIDTH] IN BLOOD BY AUTOMATED COUNT: 13.7 % (ref 11.5–14.5)
ESTIMATED CREATININE CLEARANCE: 65 ML/MIN
GLUCOSE SERPL-MCNC: 88 MG/DL (ref 70–99)
HCT VFR BLD AUTO: 39.1 % (ref 37–47)
HGB BLD-MCNC: 13.2 G/DL (ref 12–16)
MAGNESIUM SERPL-MCNC: 1.7 MG/DL (ref 1.6–2.3)
MCHC RBC AUTO-ENTMCNC: 33.8 G/DL (ref 33–37)
MCV RBC AUTO: 89.1 FL (ref 81–99)
PLATELET # BLD AUTO: 136 10^3/UL (ref 130–400)
POTASSIUM SERPL-SCNC: 4.3 MMOL/L (ref 3.5–5.1)
SODIUM SERPL-SCNC: 139 MMOL/L (ref 135–145)

## 2025-05-10 RX ADMIN — DAPAGLIFLOZIN 10 MG: 10 TABLET, FILM COATED ORAL at 08:36

## 2025-05-10 RX ADMIN — METOPROLOL SUCCINATE 25 MG: 25 TABLET, FILM COATED, EXTENDED RELEASE ORAL at 08:36

## 2025-05-10 RX ADMIN — ASPIRIN 81 MG: 81 TABLET, COATED ORAL at 08:36

## 2025-05-10 RX ADMIN — TRAMADOL HYDROCHLORIDE 50 MG: 50 TABLET, COATED ORAL at 04:58

## 2025-05-10 RX ADMIN — LEVOTHYROXINE SODIUM 100 MCG: 0.1 TABLET ORAL at 04:58

## 2025-08-22 LAB
25(OH)D3+25(OH)D2 SERPL-MCNC: 71.7 NG/ML (ref 30–100)
ALBUMIN SERPL-MCNC: 4.3 G/DL (ref 3.9–4.9)
ALP SERPL-CCNC: 90 IU/L (ref 44–121)
ALT SERPL-CCNC: 9 IU/L (ref 0–32)
AST SERPL-CCNC: 16 IU/L (ref 0–40)
BILIRUB SERPL-MCNC: 0.7 MG/DL (ref 0–1.2)
BUN SERPL-MCNC: 18 MG/DL (ref 8–27)
BUN/CREAT SERPL: 18 (ref 12–28)
CALCIUM SERPL-MCNC: 9.6 MG/DL (ref 8.7–10.3)
CHLORIDE SERPL-SCNC: 105 MMOL/L (ref 96–106)
CO2 SERPL-SCNC: 21 MMOL/L (ref 20–29)
CREAT SERPL-MCNC: 1 MG/DL (ref 0.57–1)
EGFR: 61 ML/MIN/1.73
GLOBULIN SER-MCNC: 2.6 G/DL (ref 1.5–4.5)
GLUCOSE SERPL-MCNC: 96 MG/DL (ref 70–99)
PHOSPHATE SERPL-MCNC: 4 MG/DL (ref 3–4.3)
POTASSIUM SERPL-SCNC: 4.1 MMOL/L (ref 3.5–5.2)
PROT SERPL-MCNC: 6.9 G/DL (ref 6–8.5)
PTH-INTACT SERPL-MCNC: 8 PG/ML (ref 15–65)
SODIUM SERPL-SCNC: 142 MMOL/L (ref 134–144)
T4 FREE SERPL-MCNC: 1.54 NG/DL (ref 0.82–1.77)
TSH SERPL DL<=0.005 MIU/L-ACNC: 1.03 UIU/ML (ref 0.45–4.5)